# Patient Record
Sex: FEMALE | Race: WHITE
[De-identification: names, ages, dates, MRNs, and addresses within clinical notes are randomized per-mention and may not be internally consistent; named-entity substitution may affect disease eponyms.]

---

## 2018-01-09 ENCOUNTER — HOSPITAL ENCOUNTER (INPATIENT)
Dept: HOSPITAL 7 - FB.MS | Age: 83
LOS: 3 days | Discharge: NURSING, REHAB OR LONG-TERM CARE | DRG: 291 | End: 2018-01-12
Attending: FAMILY MEDICINE | Admitting: FAMILY MEDICINE
Payer: MEDICARE

## 2018-01-09 DIAGNOSIS — E78.00: ICD-10-CM

## 2018-01-09 DIAGNOSIS — Z86.718: ICD-10-CM

## 2018-01-09 DIAGNOSIS — N18.3: ICD-10-CM

## 2018-01-09 DIAGNOSIS — Z87.891: ICD-10-CM

## 2018-01-09 DIAGNOSIS — R29.898: ICD-10-CM

## 2018-01-09 DIAGNOSIS — Z51.5: ICD-10-CM

## 2018-01-09 DIAGNOSIS — Z88.1: ICD-10-CM

## 2018-01-09 DIAGNOSIS — R29.6: ICD-10-CM

## 2018-01-09 DIAGNOSIS — R53.81: ICD-10-CM

## 2018-01-09 DIAGNOSIS — Z86.73: ICD-10-CM

## 2018-01-09 DIAGNOSIS — N17.9: ICD-10-CM

## 2018-01-09 DIAGNOSIS — E03.9: ICD-10-CM

## 2018-01-09 DIAGNOSIS — R53.1: ICD-10-CM

## 2018-01-09 DIAGNOSIS — H54.7: ICD-10-CM

## 2018-01-09 DIAGNOSIS — E11.22: ICD-10-CM

## 2018-01-09 DIAGNOSIS — M19.90: ICD-10-CM

## 2018-01-09 DIAGNOSIS — Z79.84: ICD-10-CM

## 2018-01-09 DIAGNOSIS — I87.2: ICD-10-CM

## 2018-01-09 DIAGNOSIS — R79.89: ICD-10-CM

## 2018-01-09 DIAGNOSIS — I50.31: ICD-10-CM

## 2018-01-09 DIAGNOSIS — Z79.01: ICD-10-CM

## 2018-01-09 DIAGNOSIS — D50.9: ICD-10-CM

## 2018-01-09 DIAGNOSIS — I13.0: Primary | ICD-10-CM

## 2018-01-09 DIAGNOSIS — I35.0: ICD-10-CM

## 2018-01-09 PROCEDURE — P9016 RBC LEUKOCYTES REDUCED: HCPCS

## 2018-01-09 PROCEDURE — 30233N1 TRANSFUSION OF NONAUTOLOGOUS RED BLOOD CELLS INTO PERIPHERAL VEIN, PERCUTANEOUS APPROACH: ICD-10-PCS | Performed by: FAMILY MEDICINE

## 2018-01-09 NOTE — EDM.PDOC
ED HPI GENERAL MEDICAL PROBLEM





- General


Chief Complaint: General


Stated Complaint: FALL


Time Seen by Provider: 18 12:10


Source of Information: Reports: Patient, Old Records


History Limitations: Reports: No Limitations





- History of Present Illness


INITIAL COMMENTS - FREE TEXT/NARRATIVE: 





Trixie comes to [facility] ED by EMS following an incident at home this am when she 

attempted to get off the comode and fell backward back onto the comode, to weak 

to resume activity. She used her LifeLine to summon help and arrived at the ED 

with stable VS, and emotionally shaken by the incident. She has a PMH of 

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 

ambivalent about living alone. She has a son and daughter in law who live in 

the area. 





- Related Data


 Allergies











Allergy/AdvReac Type Severity Reaction Status Date / Time


 


ciprofloxacin [From Cipro] Allergy  Rash Verified 05/15/17 10:04


 


ciprofloxacin HCl Allergy  Rash Verified 05/15/17 10:04





[From Cipro]     











Home Meds: 


 Home Meds





Hydrochlorothiazide 25 mg PO DAILY 10/30/13 [History]


Lisinopril [Prinivil] 40 mg PO DAILY 10/30/13 [History]


Multivitamin with Minerals [Multiple Vitamin] 1 tab PO DAILY 10/30/13 [History]


Nebivolol [Bystolic] 10 mg PO BID 10/30/13 [History]


Pravastatin [Pravachol] 40 mg PO BEDTIME 10/30/13 [History]


Triamcinolone Acetonide [Kenalog 0.1% Crm] 15 gm .XX BID PRN 10/30/13 [History]


Vit A/Vit C/Vit E/Zinc/Copper [Preservision Areds Softgel] 1 each PO PCLUNCH 10/

30/13 [History]


Warfarin [Coumadin] 5 mg PO SUTUTH 10/30/13 [History]


amLODIPine [Norvasc] 10 mg PO PCLUNCH 10/30/13 [History]


metFORMIN HCl [Glucophage] 500 mg PO QPM 10/30/13 [History]


Fexofenadine [Allegra] 60 mg PO DAILY PRN 10/23/14 [History]


Warfarin [Coumadin] 2.5 mg PO MOWEFRSA 10/23/14 [History]


Gabapentin [Neurontin] 100 mg PO BID PRN 16 [History]


Ketotifen Fumarate [Zaditor] 1 drop EYEBOTH BID PRN 16 [History]


Calcium Carbonate [Calcium] 600 mg PO BID@12,18 11/10/16 [History]


Furosemide [Lasix] 40 mg PO DAILY #15 tablet 05/15/17 [Rx]


Levothyroxine [Synthroid] 88 mcg PO ACBREAKFAST 05/15/17 [History]











Past Medical History


HEENT History: Reports: Cataract, Impaired Vision


Cardiovascular History: Reports: Blood Clots/VTE/DVT, Heart Failure, Heart 

Murmur, High Cholesterol, Hypertension


Respiratory History: Reports: None


Gastrointestinal History: Reports: Diverticulosis


Genitourinary History: Reports: Urinary Incontinence, Other (See Below)


Other Genitourinary History: A&P REPAIR,  DYSFUNCTIONAL UTERINE BLEEDING.


OB/GYN History: Reports: Dysfunctional Uterine Bleeding, Pregnancy


Other OB/BYN History:  II PARA II


Musculoskeletal History: Reports: Osteoarthritis


Neurological History: Reports: CVA


Psychiatric History: Reports: None


Endocrine/Metabolic History: Reports: Diabetes, Type II, Hypothyroidism, Obesity

/BMI 30+


Hematologic History: Reports: Anemia


Other Hematologic History: POST SURGICAL FROM DUB


Immunologic History: Reports: None


Oncologic (Cancer) History: Reports: None


Dermatologic History: Reports: Venous Stasis Dermatitis





- Infectious Disease History


Infectious Disease History: Reports: Chicken Pox, Measles, Mumps





- Past Surgical History


Head Surgeries/Procedures: Reports: None


HEENT Surgical History: Reports: Cataract Surgery


GI Surgical History: Reports: Appendectomy, Cholecystectomy, Colon, Colonoscopy

, Hernia, Abdominal, Hernia Repair/Other


Female  Surgical History: Reports: D&C, Hysterectomy, Oophorectomy, Salpingo-

Oophorectomy





Social & Family History





- Family History


Other HEENT Family History: SEE HX





- Tobacco Use


Smoking Status *Q: Former Smoker


Years of Tobacco use: 5


Packs/Tins Daily: 0.2


Used Tobacco, but Quit: Yes


Month Tobacco Last Used: 1970


Second Hand Smoke Exposure: No





- Caffeine Use


Caffeine Use: Reports: Coffee, Soda





- Alcohol Use


Days Per Week of Alcohol Use: 0





- Recreational Drug Use


Recreational Drug Use: No





ED ROS GENERAL





- Review of Systems


Review Of Systems: See Below


Constitutional: Reports: Weakness, Fatigue


HEENT: Reports: No Symptoms


Respiratory: Reports: No Symptoms


Cardiovascular: Reports: No Symptoms


Endocrine: Reports: Fatigue


GI/Abdominal: Reports: No Symptoms


: Reports: No Symptoms


Musculoskeletal: Reports: Leg Pain (both legs)


Skin: Reports: Other (chronic swelling in both legs)


Neurological: Reports: Difficulty Walking, Weakness


Psychiatric: Reports: Anxiety


Hematologic/Lymphatic: Reports: No Symptoms


Immunologic: Reports: No Symptoms





ED EXAM, GENERAL





- Physical Exam


Exam: See Below


Exam Limited By: No Limitations


General Appearance: Alert, WD/WN, No Apparent Distress, Anxious


Eye Exam: Bilateral Eye: EOMI, Normal Inspection, PERRL


Ears: Normal External Exam, Hearing Grossly Normal


Nose: Normal Inspection


Throat/Mouth: Normal Inspection, Normal Oropharynx, Normal Voice, No Airway 

Compromise


Head: Atraumatic, Normocephalic


Neck: Normal Inspection, Supple, Non-Tender, Full Range of Motion


Respiratory/Chest: No Respiratory Distress, No Accessory Muscle Use, Chest Non-

Tender, Decreased Breath Sounds, Rales (basilar)


Cardiovascular: Regular Rate, Rhythm, No JVD


GI/Abdominal: Normal Bowel Sounds, Soft, Non-Tender, No Organomegaly, No 

Distention, No Mass


 (Female) Exam: Deferred


Rectal (Female) Exam: Deferred


Extremities: Normal Inspection, Normal Range of Motion, Non-Tender, Pedal Edema 

(brawny edema of both legs to upper calves)


Neurological: Alert, Oriented, CN II-XII Intact, Normal Cognition, Other (

weakness, suggestive of deconditioning)


Psychiatric: Anxious


Skin Exam: Intact, Other (chronic edema with stasis changes)


Lymphatic: No Adenopathy





Course





- Vital Signs


Text/Narrative:: 





Following assessment at the [facility] ED, screening labs included: Hgb 8.4 gm, WBC 4,

000, plts nl; Na 133, K 3.9; BUN 37, Cr 1.4, GFR 36; HgbA1c 7.2%; nonFBS 216; 

BNP 8130 today (612 on 17); chest x ray: cardiac enlargement with some 

interstitial edema; case discussed with Hospitalist and she will be admitted. 


Last Recorded V/S: 


 Last Vital Signs











Temp  36.7 C   18 11:45


 


Pulse  73   18 11:45


 


Resp  16   18 11:45


 


BP  104/41 L  18 11:45


 


Pulse Ox  97   18 11:45














- Orders/Labs/Meds


Orders: 


 Active Orders 24 hr











 Category Date Time Status


 


 Chest 1V Frontal [CR] Stat Exams  18 13:29 Taken











Labs: 


 Laboratory Tests











  18 Range/Units





  12:30 12:30 12:30 


 


WBC  4.0 L    (4.5-12.0)  X10-3/uL


 


RBC  3.20 L    (3.23-5.20)  x10(6)uL


 


Hgb  8.4 L    (11.5-15.5)  g/dL


 


Hct  26.3 L    (30.0-51.3)  %


 


MCV  82.0    (80-96)  fL


 


MCH  26.2 L    (27.7-33.6)  pg


 


MCHC  31.9 L    (32.2-35.4)  g/dL


 


RDW  15.4    (11.5-15.5)  %


 


Plt Count  197    (125-369)  X10(3)uL


 


MPV  7.6    (7.4-10.4)  fL


 


Neut % (Auto)  63.5    (46-82)  %


 


Lymph % (Auto)  16.1    (13-37)  %


 


Mono % (Auto)  19.3 H    (4-12)  %


 


Eos % (Auto)  1    (1.0-5.0)  %


 


Baso % (Auto)  0    (0-2)  %


 


Neut # (Auto)  2.6    (1.6-8.3)  #


 


Lymph # (Auto)  0.6    (0.6-5.0)  #


 


Mono # (Auto)  0.8    (0.0-1.3)  #


 


Eos # (Auto)  0.0    (0.0-0.8)  #


 


Baso # (Auto)  0.0    (0.0-0.2)  #


 


PT   21.1 H   (8.7-11.1)  


 


INR   2.06 H   (0.89-1.13)  


 


Sodium    133 L  (135-145)  mmol/L


 


Potassium    3.9  (3.5-5.3)  mmol/L


 


Chloride    97 L  (100-110)  mmol/L


 


Carbon Dioxide    26  (21-32)  mmol/L


 


BUN    37 H  (7-18)  mg/dL


 


Creatinine    1.4 H  (0.55-1.02)  mg/dL


 


Est Cr Clr Drug Dosing    26.29  mL/min


 


Estimated GFR (MDRD)    36 L  (>60)  


 


BUN/Creatinine Ratio    26.4 H  (9-20)  


 


Glucose    216 H  ()  mg/dL


 


Hemoglobin A1c     (4.5-6.2)  %


 


Calcium    8.7  (8.6-10.2)  mg/dL


 


Total Bilirubin    0.5  (0.1-1.3)  mg/dL


 


AST    30 H  (5-25)  IU/L


 


ALT    18  (12-36)  U/L


 


Alkaline Phosphatase    62  ()  IU/L


 


NT-Pro-B Natriuret Pep     (<=450)  pg/mL


 


Total Protein    7.1  (6.0-8.0)  g/dL


 


Albumin    2.8 L  (3.2-4.6)  g/dL


 


Globulin    4.3  g/dL


 


Albumin/Globulin Ratio    0.7  


 


Urine Color     (YELLOW)  


 


Urine Appearance     (CLEAR)  


 


Urine pH     (5.0-6.5)  


 


Ur Specific Gravity     (1.010-1.025)  


 


Urine Protein     (NEGATIVE)  mg/dL


 


Urine Glucose (UA)     (NEGATIVE)  mg/dL


 


Urine Ketones     (NEGATIVE)  mg/dL


 


Urine Occult Blood     (NEGATIVE)  


 


Urine Nitrite     (NEGATIVE)  


 


Urine Bilirubin     (NEGATIVE)  


 


Urine Urobilinogen     (NEGATIVE)  mg/dL


 


Ur Leukocyte Esterase     (NEGATIVE)  


 


Urine RBC     (0)  


 


Urine WBC     (0)  


 


Ur Squamous Epith Cells     (NS,R,O)  


 


Urine Bacteria     (NS)  


 


Fine Granular Casts     (NS)  


 


Coarse Granular Casts     (NS)  














  18 Range/Units





  12:30 12:30 13:26 


 


WBC     (4.5-12.0)  X10-3/uL


 


RBC     (3.23-5.20)  x10(6)uL


 


Hgb     (11.5-15.5)  g/dL


 


Hct     (30.0-51.3)  %


 


MCV     (80-96)  fL


 


MCH     (27.7-33.6)  pg


 


MCHC     (32.2-35.4)  g/dL


 


RDW     (11.5-15.5)  %


 


Plt Count     (125-369)  X10(3)uL


 


MPV     (7.4-10.4)  fL


 


Neut % (Auto)     (46-82)  %


 


Lymph % (Auto)     (13-37)  %


 


Mono % (Auto)     (4-12)  %


 


Eos % (Auto)     (1.0-5.0)  %


 


Baso % (Auto)     (0-2)  %


 


Neut # (Auto)     (1.6-8.3)  #


 


Lymph # (Auto)     (0.6-5.0)  #


 


Mono # (Auto)     (0.0-1.3)  #


 


Eos # (Auto)     (0.0-0.8)  #


 


Baso # (Auto)     (0.0-0.2)  #


 


PT     (8.7-11.1)  


 


INR     (0.89-1.13)  


 


Sodium     (135-145)  mmol/L


 


Potassium     (3.5-5.3)  mmol/L


 


Chloride     (100-110)  mmol/L


 


Carbon Dioxide     (21-32)  mmol/L


 


BUN     (7-18)  mg/dL


 


Creatinine     (0.55-1.02)  mg/dL


 


Est Cr Clr Drug Dosing     mL/min


 


Estimated GFR (MDRD)     (>60)  


 


BUN/Creatinine Ratio     (9-20)  


 


Glucose     ()  mg/dL


 


Hemoglobin A1c   7.2 H   (4.5-6.2)  %


 


Calcium     (8.6-10.2)  mg/dL


 


Total Bilirubin     (0.1-1.3)  mg/dL


 


AST     (5-25)  IU/L


 


ALT     (12-36)  U/L


 


Alkaline Phosphatase     ()  IU/L


 


NT-Pro-B Natriuret Pep  8130 H*    (<=450)  pg/mL


 


Total Protein     (6.0-8.0)  g/dL


 


Albumin     (3.2-4.6)  g/dL


 


Globulin     g/dL


 


Albumin/Globulin Ratio     


 


Urine Color    Yellow  (YELLOW)  


 


Urine Appearance    Clear  (CLEAR)  


 


Urine pH    5.0  (5.0-6.5)  


 


Ur Specific Gravity    1.010  (1.010-1.025)  


 


Urine Protein    Negative  (NEGATIVE)  mg/dL


 


Urine Glucose (UA)    Normal  (NEGATIVE)  mg/dL


 


Urine Ketones    Negative  (NEGATIVE)  mg/dL


 


Urine Occult Blood    Negative  (NEGATIVE)  


 


Urine Nitrite    Negative  (NEGATIVE)  


 


Urine Bilirubin    Negative  (NEGATIVE)  


 


Urine Urobilinogen    Normal  (NEGATIVE)  mg/dL


 


Ur Leukocyte Esterase    Negative  (NEGATIVE)  


 


Urine RBC    0-5  (0)  


 


Urine WBC    0-5  (0)  


 


Ur Squamous Epith Cells    Few H  (NS,R,O)  


 


Urine Bacteria    Moderate H  (NS)  


 


Fine Granular Casts    Few H  (NS)  


 


Coarse Granular Casts    Few H  (NS)  














Departure





- Departure


Time of Disposition: 14:12


Disposition: Admitted As Inpatient 66


Condition: Fair


Clinical Impression: 


 CHF, Congestive heart failure, Weakness of both lower extremities








- Discharge Information


Referrals: 


Mabel Ruiz NP [Primary Care Provider] - 


Forms:  ED Department Discharge





- Problem List & Annotations


(1) CHF, Congestive heart failure


SNOMED Code(s): 66457061


   Code(s): I50.9 - HEART FAILURE, UNSPECIFIED   Status: Acute   Current Visit: 

Yes   Annotation/Comment:: Admit to In Pt   





- Problem List Review


Problem List Initiated/Reviewed/Updated: Yes





- My Orders


Last 24 Hours: 


My Active Orders





18 13:29


Chest 1V Frontal [CR] Stat 














- Assessment/Plan


Last 24 Hours: 


My Active Orders





18 13:29


Chest 1V Frontal [CR] Stat 











Plan: 





Follow up with PCP.
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
## 2018-01-10 NOTE — CR
INDICATION:  Weakness, high BNP.



CHEST:  An AP upright view of the chest, 01/09/2018, was compared with 05/14/
2017 and again reveals the heart to be enlarged, the aorta tortuous and 
calcified. 



Overlying snaps are noted. 



Pulmonary vasculature appears to be prominent, compatible with CHF.  



Minimal, if any, interstitial lung edema is present.  



No active infiltrate or effusion was seen.  



IMPRESSION:  Findings are compatible with ASHD, cardiomegaly, with mild to 
moderate CHF.  No definite lung edema identified.  
MTDD

## 2018-01-10 NOTE — PCM.PN
- General Info


Date of Service: 01/10/18


Subjective Update: 





Pleasant 83-year-old female awake lying in bed. Tells me she feels a bit better 

with regards to no more tremulousness. Still feels weak. No longer pale. She 

denies headache, tinnitus, blurred vision, nasal congestion or sore throat, no 

coughing, shortness of breath, chest pain or pressure, abdominal pain, or 

worsening pain of the lower extremities. She is tolerating the Ace wraps quite 

well. She has been getting up to the restroom and has made good urine output. 

She is tolerating her diet. Is less anxious this morning. No overnight events 

on telemetry. Nursing without concerns.








- Related Data


Allergies/Adverse Reactions: 


 Allergies











Allergy/AdvReac Type Severity Reaction Status Date / Time


 


ciprofloxacin [From Cipro] Allergy  Rash Verified 05/15/17 10:04


 


ciprofloxacin HCl Allergy  Rash Verified 05/15/17 10:04





[From Cipro]     











Prior to admission Home Medications: 


 Home Meds





Hydrochlorothiazide 25 mg PO DAILY 10/30/13 [History]


Lisinopril [Prinivil] 40 mg PO DAILY 10/30/13 [History]


Multivitamin with Minerals [Multiple Vitamin] 2 tab PO DAILY 10/30/13 [History]


Nebivolol [Bystolic] 10 mg PO BID 10/30/13 [History]


Pravastatin [Pravachol] 40 mg PO BEDTIME 10/30/13 [History]


Triamcinolone Acetonide [Kenalog 0.1% Crm] 15 gm .XX BID PRN 10/30/13 [History]


Vit A/Vit C/Vit E/Zinc/Copper [Preservision Areds Softgel] 1 each PO PCLUNCH 10/

30/13 [History]


metFORMIN HCl [Glucophage] 500 mg PO QPM 10/30/13 [History]


Warfarin [Coumadin] 2.5 mg PO 1600 10/23/14 [History]


Ketotifen Fumarate [Zaditor] 1 drop EYEBOTH BID PRN 11/09/16 [History]


Calcium Carbonate [Calcium] 600 mg PO 10,18 11/10/16 [History]


Furosemide [Lasix] 40 mg PO DAILY #15 tablet 05/15/17 [Rx]


Levothyroxine [Synthroid] 88 mcg PO ACBREAKFAST 05/15/17 [History]


Ferrous Gluconate 324 mg PO DAILY 01/09/18 [History]


amLODIPine [Norvasc] 2.5 mg PO DAILY 01/09/18 [History]





Functional Status: Reports: Tolerating Diet, Ambulating, Urinating.  Denies: 

New Symptoms





- Review of Systems


Systems Review Comment:: 





Please see above for pertinent positives and negatives





- Patient Data


Vitals - Most Recent: 





 Vital Signs - 8 hr











  01/10/18 01/10/18





  07:25 10:50


 


Temperature [ 97.9 F 





Oral]  


 


Pulse,  67





Peripheral  


 


Pulse, 67 





Peripheral [  





Left Pulse  





Oximetry]  


 


Respiratory 20 





Rate  


 


Blood Pressure  126/62


 


Blood Pressure 126/62 





[Left Upper Arm  





]  


 


O2 Sat by Pulse 94 L 





Oximetry  














Orthostatic Blood Pressure [     128/60


Standing]                        


Orthostatic Blood Pressure [     120/56


Sitting]                         


Orthostatic Blood Pressure [     110/58


Supine]                          








Weight - Most Recent: 86.636 kg


I&O - Last 24 Hours: 


 Intake & Output











 01/09/18 01/10/18 01/10/18





 22:59 06:59 14:59


 


Intake Total 130 358 


 


Output Total 750 800 


 


Balance -620 -442 








Weight on admit: 87.407 kg


Weight today: 86.636 kg


Total weight loss: 0.771 kg (1.7 pound)





Overall urine output since admission roughly 1500 mL


Lab Results Last 24 Hours: 


 


 Laboratory Tests











  01/09/18 01/09/18 01/09/18 Range/Units





  12:30 12:30 12:30 


 


WBC  4.0 L    (4.5-12.0)  X10-3/uL


 


RBC  3.20 L    (3.23-5.20)  x10(6)uL


 


Hgb  8.4 L    (11.5-15.5)  g/dL


 


Hct  26.3 L    (30.0-51.3)  %


 


MCV  82.0    (80-96)  fL


 


MCH  26.2 L    (27.7-33.6)  pg


 


MCHC  31.9 L    (32.2-35.4)  g/dL


 


RDW  15.4    (11.5-15.5)  %


 


Plt Count  197    (125-369)  X10(3)uL


 


MPV  7.6    (7.4-10.4)  fL


 


Neut % (Auto)  63.5    (46-82)  %


 


Lymph % (Auto)  16.1    (13-37)  %


 


Mono % (Auto)  19.3 H    (4-12)  %


 


Eos % (Auto)  1    (1.0-5.0)  %


 


Baso % (Auto)  0    (0-2)  %


 


Neut # (Auto)  2.6    (1.6-8.3)  #


 


Lymph # (Auto)  0.6    (0.6-5.0)  #


 


Mono # (Auto)  0.8    (0.0-1.3)  #


 


Eos # (Auto)  0.0    (0.0-0.8)  #


 


Baso # (Auto)  0.0    (0.0-0.2)  #


 


PT   21.1 H   (8.7-11.1)  


 


INR   2.06 H   (0.89-1.13)  


 


Sodium    133 L  (135-145)  mmol/L


 


Potassium    3.9  (3.5-5.3)  mmol/L


 


Chloride    97 L  (100-110)  mmol/L


 


Carbon Dioxide    26  (21-32)  mmol/L


 


BUN    37 H  (7-18)  mg/dL


 


Creatinine    1.4 H  (0.55-1.02)  mg/dL


 


Est Cr Clr Drug Dosing    26.29  mL/min


 


Estimated GFR (MDRD)    36 L  (>60)  


 


BUN/Creatinine Ratio    26.4 H  (9-20)  


 


Glucose    216 H  ()  mg/dL


 


POC Glucose     ()  mg/dL


 


Hemoglobin A1c     (4.5-6.2)  %


 


Calcium    8.7  (8.6-10.2)  mg/dL


 


Total Bilirubin    0.5  (0.1-1.3)  mg/dL


 


AST    30 H  (5-25)  IU/L


 


ALT    18  (12-36)  U/L


 


Alkaline Phosphatase    62  ()  IU/L


 


NT-Pro-B Natriuret Pep     (<=450)  pg/mL


 


Total Protein    7.1  (6.0-8.0)  g/dL


 


Albumin    2.8 L  (3.2-4.6)  g/dL


 


Globulin    4.3  g/dL


 


Albumin/Globulin Ratio    0.7  


 


Vitamin B12     (193-986)  pg/mL


 


TSH, Ultra Sensitive     (0.36-3.74)  IU/mL


 


Urine Color     (YELLOW)  


 


Urine Appearance     (CLEAR)  


 


Urine pH     (5.0-6.5)  


 


Ur Specific Gravity     (1.010-1.025)  


 


Urine Protein     (NEGATIVE)  mg/dL


 


Urine Glucose (UA)     (NEGATIVE)  mg/dL


 


Urine Ketones     (NEGATIVE)  mg/dL


 


Urine Occult Blood     (NEGATIVE)  


 


Urine Nitrite     (NEGATIVE)  


 


Urine Bilirubin     (NEGATIVE)  


 


Urine Urobilinogen     (NEGATIVE)  mg/dL


 


Ur Leukocyte Esterase     (NEGATIVE)  


 


Urine RBC     (0)  


 


Urine WBC     (0)  


 


Ur Squamous Epith Cells     (NS,R,O)  


 


Urine Bacteria     (NS)  


 


Fine Granular Casts     (NS)  


 


Coarse Granular Casts     (NS)  


 


Blood Type     


 


Gel Antibody Screen     


 


Crossmatch     














  01/09/18 01/09/18 01/09/18 Range/Units





  12:30 12:30 12:30 


 


WBC     (4.5-12.0)  X10-3/uL


 


RBC     (3.23-5.20)  x10(6)uL


 


Hgb     (11.5-15.5)  g/dL


 


Hct     (30.0-51.3)  %


 


MCV     (80-96)  fL


 


MCH     (27.7-33.6)  pg


 


MCHC     (32.2-35.4)  g/dL


 


RDW     (11.5-15.5)  %


 


Plt Count     (125-369)  X10(3)uL


 


MPV     (7.4-10.4)  fL


 


Neut % (Auto)     (46-82)  %


 


Lymph % (Auto)     (13-37)  %


 


Mono % (Auto)     (4-12)  %


 


Eos % (Auto)     (1.0-5.0)  %


 


Baso % (Auto)     (0-2)  %


 


Neut # (Auto)     (1.6-8.3)  #


 


Lymph # (Auto)     (0.6-5.0)  #


 


Mono # (Auto)     (0.0-1.3)  #


 


Eos # (Auto)     (0.0-0.8)  #


 


Baso # (Auto)     (0.0-0.2)  #


 


PT     (8.7-11.1)  


 


INR     (0.89-1.13)  


 


Sodium     (135-145)  mmol/L


 


Potassium     (3.5-5.3)  mmol/L


 


Chloride     (100-110)  mmol/L


 


Carbon Dioxide     (21-32)  mmol/L


 


BUN     (7-18)  mg/dL


 


Creatinine     (0.55-1.02)  mg/dL


 


Est Cr Clr Drug Dosing     mL/min


 


Estimated GFR (MDRD)     (>60)  


 


BUN/Creatinine Ratio     (9-20)  


 


Glucose     ()  mg/dL


 


POC Glucose     ()  mg/dL


 


Hemoglobin A1c   7.2 H   (4.5-6.2)  %


 


Calcium     (8.6-10.2)  mg/dL


 


Total Bilirubin     (0.1-1.3)  mg/dL


 


AST     (5-25)  IU/L


 


ALT     (12-36)  U/L


 


Alkaline Phosphatase     ()  IU/L


 


NT-Pro-B Natriuret Pep  8130 H*    (<=450)  pg/mL


 


Total Protein     (6.0-8.0)  g/dL


 


Albumin     (3.2-4.6)  g/dL


 


Globulin     g/dL


 


Albumin/Globulin Ratio     


 


Vitamin B12     (193-986)  pg/mL


 


TSH, Ultra Sensitive     (0.36-3.74)  IU/mL


 


Urine Color     (YELLOW)  


 


Urine Appearance     (CLEAR)  


 


Urine pH     (5.0-6.5)  


 


Ur Specific Gravity     (1.010-1.025)  


 


Urine Protein     (NEGATIVE)  mg/dL


 


Urine Glucose (UA)     (NEGATIVE)  mg/dL


 


Urine Ketones     (NEGATIVE)  mg/dL


 


Urine Occult Blood     (NEGATIVE)  


 


Urine Nitrite     (NEGATIVE)  


 


Urine Bilirubin     (NEGATIVE)  


 


Urine Urobilinogen     (NEGATIVE)  mg/dL


 


Ur Leukocyte Esterase     (NEGATIVE)  


 


Urine RBC     (0)  


 


Urine WBC     (0)  


 


Ur Squamous Epith Cells     (NS,R,O)  


 


Urine Bacteria     (NS)  


 


Fine Granular Casts     (NS)  


 


Coarse Granular Casts     (NS)  


 


Blood Type    A POSITIVE  


 


Gel Antibody Screen    Negative  


 


Crossmatch    See Detail  














  01/09/18 01/09/18 01/09/18 Range/Units





  12:30 13:26 17:08 


 


WBC     (4.5-12.0)  X10-3/uL


 


RBC     (3.23-5.20)  x10(6)uL


 


Hgb     (11.5-15.5)  g/dL


 


Hct     (30.0-51.3)  %


 


MCV     (80-96)  fL


 


MCH     (27.7-33.6)  pg


 


MCHC     (32.2-35.4)  g/dL


 


RDW     (11.5-15.5)  %


 


Plt Count     (125-369)  X10(3)uL


 


MPV     (7.4-10.4)  fL


 


Neut % (Auto)     (46-82)  %


 


Lymph % (Auto)     (13-37)  %


 


Mono % (Auto)     (4-12)  %


 


Eos % (Auto)     (1.0-5.0)  %


 


Baso % (Auto)     (0-2)  %


 


Neut # (Auto)     (1.6-8.3)  #


 


Lymph # (Auto)     (0.6-5.0)  #


 


Mono # (Auto)     (0.0-1.3)  #


 


Eos # (Auto)     (0.0-0.8)  #


 


Baso # (Auto)     (0.0-0.2)  #


 


PT     (8.7-11.1)  


 


INR     (0.89-1.13)  


 


Sodium     (135-145)  mmol/L


 


Potassium     (3.5-5.3)  mmol/L


 


Chloride     (100-110)  mmol/L


 


Carbon Dioxide     (21-32)  mmol/L


 


BUN     (7-18)  mg/dL


 


Creatinine     (0.55-1.02)  mg/dL


 


Est Cr Clr Drug Dosing     mL/min


 


Estimated GFR (MDRD)     (>60)  


 


BUN/Creatinine Ratio     (9-20)  


 


Glucose     ()  mg/dL


 


POC Glucose    184 H  ()  mg/dL


 


Hemoglobin A1c     (4.5-6.2)  %


 


Calcium     (8.6-10.2)  mg/dL


 


Total Bilirubin     (0.1-1.3)  mg/dL


 


AST     (5-25)  IU/L


 


ALT     (12-36)  U/L


 


Alkaline Phosphatase     ()  IU/L


 


NT-Pro-B Natriuret Pep     (<=450)  pg/mL


 


Total Protein     (6.0-8.0)  g/dL


 


Albumin     (3.2-4.6)  g/dL


 


Globulin     g/dL


 


Albumin/Globulin Ratio     


 


Vitamin B12     (193-986)  pg/mL


 


TSH, Ultra Sensitive  6.32 H    (0.36-3.74)  IU/mL


 


Urine Color   Yellow   (YELLOW)  


 


Urine Appearance   Clear   (CLEAR)  


 


Urine pH   5.0   (5.0-6.5)  


 


Ur Specific Gravity   1.010   (1.010-1.025)  


 


Urine Protein   Negative   (NEGATIVE)  mg/dL


 


Urine Glucose (UA)   Normal   (NEGATIVE)  mg/dL


 


Urine Ketones   Negative   (NEGATIVE)  mg/dL


 


Urine Occult Blood   Negative   (NEGATIVE)  


 


Urine Nitrite   Negative   (NEGATIVE)  


 


Urine Bilirubin   Negative   (NEGATIVE)  


 


Urine Urobilinogen   Normal   (NEGATIVE)  mg/dL


 


Ur Leukocyte Esterase   Negative   (NEGATIVE)  


 


Urine RBC   0-5   (0)  


 


Urine WBC   0-5   (0)  


 


Ur Squamous Epith Cells   Few H   (NS,R,O)  


 


Urine Bacteria   Moderate H   (NS)  


 


Fine Granular Casts   Few H   (NS)  


 


Coarse Granular Casts   Few H   (NS)  


 


Blood Type     


 


Gel Antibody Screen     


 


Crossmatch     














  01/09/18 01/10/18 01/10/18 Range/Units





  20:52 09:15 09:15 


 


WBC   2.9 L   (4.5-12.0)  X10-3/uL


 


RBC   3.55   (3.23-5.20)  x10(6)uL


 


Hgb   9.3 L   (11.5-15.5)  g/dL


 


Hct   29.3 L   (30.0-51.3)  %


 


MCV   82.6   (80-96)  fL


 


MCH   26.3 L   (27.7-33.6)  pg


 


MCHC   31.8 L   (32.2-35.4)  g/dL


 


RDW   15.2   (11.5-15.5)  %


 


Plt Count   178   (125-369)  X10(3)uL


 


MPV     (7.4-10.4)  fL


 


Neut % (Auto)     (46-82)  %


 


Lymph % (Auto)     (13-37)  %


 


Mono % (Auto)     (4-12)  %


 


Eos % (Auto)     (1.0-5.0)  %


 


Baso % (Auto)     (0-2)  %


 


Neut # (Auto)     (1.6-8.3)  #


 


Lymph # (Auto)     (0.6-5.0)  #


 


Mono # (Auto)     (0.0-1.3)  #


 


Eos # (Auto)     (0.0-0.8)  #


 


Baso # (Auto)     (0.0-0.2)  #


 


PT     (8.7-11.1)  


 


INR     (0.89-1.13)  


 


Sodium    135  (135-145)  mmol/L


 


Potassium    3.4 L  (3.5-5.3)  mmol/L


 


Chloride    98 L  (100-110)  mmol/L


 


Carbon Dioxide    28  (21-32)  mmol/L


 


BUN    28 H  (7-18)  mg/dL


 


Creatinine    1.3 H  (0.55-1.02)  mg/dL


 


Est Cr Clr Drug Dosing    28.31  mL/min


 


Estimated GFR (MDRD)    39 L  (>60)  


 


BUN/Creatinine Ratio    21.5 H  (9-20)  


 


Glucose    236 H  ()  mg/dL


 


POC Glucose  201 H    ()  mg/dL


 


Hemoglobin A1c     (4.5-6.2)  %


 


Calcium    8.9  (8.6-10.2)  mg/dL


 


Total Bilirubin     (0.1-1.3)  mg/dL


 


AST     (5-25)  IU/L


 


ALT     (12-36)  U/L


 


Alkaline Phosphatase     ()  IU/L


 


NT-Pro-B Natriuret Pep     (<=450)  pg/mL


 


Total Protein     (6.0-8.0)  g/dL


 


Albumin     (3.2-4.6)  g/dL


 


Globulin     g/dL


 


Albumin/Globulin Ratio     


 


Vitamin B12     (193-986)  pg/mL


 


TSH, Ultra Sensitive     (0.36-3.74)  IU/mL


 


Urine Color     (YELLOW)  


 


Urine Appearance     (CLEAR)  


 


Urine pH     (5.0-6.5)  


 


Ur Specific Gravity     (1.010-1.025)  


 


Urine Protein     (NEGATIVE)  mg/dL


 


Urine Glucose (UA)     (NEGATIVE)  mg/dL


 


Urine Ketones     (NEGATIVE)  mg/dL


 


Urine Occult Blood     (NEGATIVE)  


 


Urine Nitrite     (NEGATIVE)  


 


Urine Bilirubin     (NEGATIVE)  


 


Urine Urobilinogen     (NEGATIVE)  mg/dL


 


Ur Leukocyte Esterase     (NEGATIVE)  


 


Urine RBC     (0)  


 


Urine WBC     (0)  


 


Ur Squamous Epith Cells     (NS,R,O)  


 


Urine Bacteria     (NS)  


 


Fine Granular Casts     (NS)  


 


Coarse Granular Casts     (NS)  


 


Blood Type     


 


Gel Antibody Screen     


 


Crossmatch     














  01/10/18 01/10/18 01/10/18 Range/Units





  09:15 09:15 09:15 


 


WBC     (4.5-12.0)  X10-3/uL


 


RBC     (3.23-5.20)  x10(6)uL


 


Hgb     (11.5-15.5)  g/dL


 


Hct     (30.0-51.3)  %


 


MCV     (80-96)  fL


 


MCH     (27.7-33.6)  pg


 


MCHC     (32.2-35.4)  g/dL


 


RDW     (11.5-15.5)  %


 


Plt Count     (125-369)  X10(3)uL


 


MPV     (7.4-10.4)  fL


 


Neut % (Auto)     (46-82)  %


 


Lymph % (Auto)     (13-37)  %


 


Mono % (Auto)     (4-12)  %


 


Eos % (Auto)     (1.0-5.0)  %


 


Baso % (Auto)     (0-2)  %


 


Neut # (Auto)     (1.6-8.3)  #


 


Lymph # (Auto)     (0.6-5.0)  #


 


Mono # (Auto)     (0.0-1.3)  #


 


Eos # (Auto)     (0.0-0.8)  #


 


Baso # (Auto)     (0.0-0.2)  #


 


PT  22.4 H    (8.7-11.1)  


 


INR  2.18 H    (0.89-1.13)  


 


Sodium     (135-145)  mmol/L


 


Potassium     (3.5-5.3)  mmol/L


 


Chloride     (100-110)  mmol/L


 


Carbon Dioxide     (21-32)  mmol/L


 


BUN     (7-18)  mg/dL


 


Creatinine     (0.55-1.02)  mg/dL


 


Est Cr Clr Drug Dosing     mL/min


 


Estimated GFR (MDRD)     (>60)  


 


BUN/Creatinine Ratio     (9-20)  


 


Glucose     ()  mg/dL


 


POC Glucose     ()  mg/dL


 


Hemoglobin A1c     (4.5-6.2)  %


 


Calcium     (8.6-10.2)  mg/dL


 


Total Bilirubin     (0.1-1.3)  mg/dL


 


AST     (5-25)  IU/L


 


ALT     (12-36)  U/L


 


Alkaline Phosphatase     ()  IU/L


 


NT-Pro-B Natriuret Pep   32928 H*   (<=450)  pg/mL


 


Total Protein     (6.0-8.0)  g/dL


 


Albumin     (3.2-4.6)  g/dL


 


Globulin     g/dL


 


Albumin/Globulin Ratio     


 


Vitamin B12    685  (193-986)  pg/mL


 


TSH, Ultra Sensitive     (0.36-3.74)  IU/mL


 


Urine Color     (YELLOW)  


 


Urine Appearance     (CLEAR)  


 


Urine pH     (5.0-6.5)  


 


Ur Specific Gravity     (1.010-1.025)  


 


Urine Protein     (NEGATIVE)  mg/dL


 


Urine Glucose (UA)     (NEGATIVE)  mg/dL


 


Urine Ketones     (NEGATIVE)  mg/dL


 


Urine Occult Blood     (NEGATIVE)  


 


Urine Nitrite     (NEGATIVE)  


 


Urine Bilirubin     (NEGATIVE)  


 


Urine Urobilinogen     (NEGATIVE)  mg/dL


 


Ur Leukocyte Esterase     (NEGATIVE)  


 


Urine RBC     (0)  


 


Urine WBC     (0)  


 


Ur Squamous Epith Cells     (NS,R,O)  


 


Urine Bacteria     (NS)  


 


Fine Granular Casts     (NS)  


 


Coarse Granular Casts     (NS)  


 


Blood Type     


 


Gel Antibody Screen     


 


Crossmatch     














  01/10/18 Range/Units





  11:23 


 


WBC   (4.5-12.0)  X10-3/uL


 


RBC   (3.23-5.20)  x10(6)uL


 


Hgb   (11.5-15.5)  g/dL


 


Hct   (30.0-51.3)  %


 


MCV   (80-96)  fL


 


MCH   (27.7-33.6)  pg


 


MCHC   (32.2-35.4)  g/dL


 


RDW   (11.5-15.5)  %


 


Plt Count   (125-369)  X10(3)uL


 


MPV   (7.4-10.4)  fL


 


Neut % (Auto)   (46-82)  %


 


Lymph % (Auto)   (13-37)  %


 


Mono % (Auto)   (4-12)  %


 


Eos % (Auto)   (1.0-5.0)  %


 


Baso % (Auto)   (0-2)  %


 


Neut # (Auto)   (1.6-8.3)  #


 


Lymph # (Auto)   (0.6-5.0)  #


 


Mono # (Auto)   (0.0-1.3)  #


 


Eos # (Auto)   (0.0-0.8)  #


 


Baso # (Auto)   (0.0-0.2)  #


 


PT   (8.7-11.1)  


 


INR   (0.89-1.13)  


 


Sodium   (135-145)  mmol/L


 


Potassium   (3.5-5.3)  mmol/L


 


Chloride   (100-110)  mmol/L


 


Carbon Dioxide   (21-32)  mmol/L


 


BUN   (7-18)  mg/dL


 


Creatinine   (0.55-1.02)  mg/dL


 


Est Cr Clr Drug Dosing   mL/min


 


Estimated GFR (MDRD)   (>60)  


 


BUN/Creatinine Ratio   (9-20)  


 


Glucose   ()  mg/dL


 


POC Glucose  191 H  ()  mg/dL


 


Hemoglobin A1c   (4.5-6.2)  %


 


Calcium   (8.6-10.2)  mg/dL


 


Total Bilirubin   (0.1-1.3)  mg/dL


 


AST   (5-25)  IU/L


 


ALT   (12-36)  U/L


 


Alkaline Phosphatase   ()  IU/L


 


NT-Pro-B Natriuret Pep   (<=450)  pg/mL


 


Total Protein   (6.0-8.0)  g/dL


 


Albumin   (3.2-4.6)  g/dL


 


Globulin   g/dL


 


Albumin/Globulin Ratio   


 


Vitamin B12   (193-986)  pg/mL


 


TSH, Ultra Sensitive   (0.36-3.74)  IU/mL


 


Urine Color   (YELLOW)  


 


Urine Appearance   (CLEAR)  


 


Urine pH   (5.0-6.5)  


 


Ur Specific Gravity   (1.010-1.025)  


 


Urine Protein   (NEGATIVE)  mg/dL


 


Urine Glucose (UA)   (NEGATIVE)  mg/dL


 


Urine Ketones   (NEGATIVE)  mg/dL


 


Urine Occult Blood   (NEGATIVE)  


 


Urine Nitrite   (NEGATIVE)  


 


Urine Bilirubin   (NEGATIVE)  


 


Urine Urobilinogen   (NEGATIVE)  mg/dL


 


Ur Leukocyte Esterase   (NEGATIVE)  


 


Urine RBC   (0)  


 


Urine WBC   (0)  


 


Ur Squamous Epith Cells   (NS,R,O)  


 


Urine Bacteria   (NS)  


 


Fine Granular Casts   (NS)  


 


Coarse Granular Casts   (NS)  


 


Blood Type   


 


Gel Antibody Screen   


 


Crossmatch   











[facility] Results Last 24 Hours: 


 Microbiology











 01/09/18 16:00 Stool Occult Blood (ALONSO) - Final





 Stool / Feces - Stool, Formed    NEGATIVE OCCULT BLOOD











Med Orders - Current: 


 Current Medications





Ferrous Gluconate (Ferrous Gluconate)  324 mg PO DAILY [facility]


   Last Admin: 01/10/18 09:06 Dose:  324 mg


Furosemide (Lasix)  20 mg IVPUSH ONETIME PRN


   PRN Reason: Edema


Furosemide (Lasix)  40 mg PO DAILY [facility]


Glimepiride (Glimepiride)  1 mg PO WITHBREAKFAST [facility]


Sodium Chloride (Normal Saline)  250 mls @ 100 mls/hr IV ASDIRECTED [facility]


Insulin Aspart (Novolog)  0 unit SUBCUT QIDACANDBED [facility]


   PRN Reason: Protocol


   Last Admin: 01/10/18 06:45 Dose:  Not Given


Levothyroxine Sodium (Synthroid)  88 mcg PO 0600 [facility]


   Last Admin: 01/10/18 05:57 Dose:  88 mcg


Lisinopril (Prinivil)  20 mg PO DAILY [facility]


Lorazepam (Ativan)  0.5 mg PO Q8H PRN


   PRN Reason: Anxiety


Nebivolol (Bystolic)  10 mg PO DAILY [facility]


Warfarin Sodium (Coumadin)  2.5 mg PO 1600 [facility]


   Last Admin: 01/09/18 16:56 Dose:  2.5 mg


Zolpidem Tartrate (Ambien)  5 mg PO BEDTIME PRN


   PRN Reason: Sleep





Discontinued Medications





Furosemide (Lasix)  20 mg IVPUSH ONETIME ONE


   Stop: 01/09/18 16:32


   Last Admin: 01/09/18 16:57 Dose:  20 mg











- Exam


Physical Findings Comments:: 





General: Alert, Oriented, no distress


HEENT: Conjunctiva Clear normal tear film, oral mucosa is pink and moist. There 

is no scleral icterus.


Neck:Supple, Trachea Midline.  No: JVD


Lungs: Normal Respiratory Effort, Rales (minimal noted in left lower lobe, 

otherwise clear throughout.)


Cardiovascular: Regular Rate, Regular Rhythm, Systolic Murmur (3-4/6 aditya 

radiating throughout precordium).  No: Rubs, Gallop/S3, Gallop/S4


GI/Abdominal Exam: Normal Bowel Sounds, Soft, Non-Tender


Back Exam: Normal Inspection.  No: CVA Tenderness (R), CVA Tenderness (L), 

Muscle Spasm


Extremities: Increased Warmth, Redness (bilateral ankles to mid shins/mild. 

edema 2+ with stasis dermatitis. no weeping or open wounds. Symmetric edema. )


Skin: Skin is improved in coloration no longer pale. No: Wound,


Psychiatric: Labile Mood, Anxious-fear being home alone due to her debility, 

Depressed





- Problem List & Annotations


(1) Complaint of debility and malaise


SNOMED Code(s): 120468718


   Code(s): R53.81 - OTHER MALAISE   Status: Acute   Current Visit: Yes   





(2) Acute on chronic renal failure


SNOMED Code(s): 000768093


   Code(s): N17.9 - ACUTE KIDNEY FAILURE, UNSPECIFIED; N18.9 - CHRONIC KIDNEY 

DISEASE, UNSPECIFIED   Status: Acute   Current Visit: Yes   





(3) Microcytic hypochromic anemia


SNOMED Code(s): 62499504


   Code(s): D50.9 - IRON DEFICIENCY ANEMIA, UNSPECIFIED   Status: Chronic   

Current Visit: Yes   





(4) Weakness of both lower extremities


SNOMED Code(s): 8570569


   Code(s): R29.898 - OTH SYMPTOMS AND SIGNS INVOLVING THE MUSCULOSKELETAL 

SYSTEM   Status: Acute   Current Visit: Yes   





(5) CHF, Congestive heart failure


SNOMED Code(s): 94498097


   Code(s): I50.9 - HEART FAILURE, UNSPECIFIED   Status: Chronic   Current Visit

: Yes   Annotation/Comment:: Admit to In Pt   





(6) Palliative care status


SNOMED Code(s): 814643651


   Code(s): Z51.5 - ENCOUNTER FOR PALLIATIVE CARE   Status: Acute   Current 

Visit: Yes   





(7) Elevated brain natriuretic peptide (BNP) level


SNOMED Code(s): 075873245


   Code(s): R79.89 - OTHER SPECIFIED ABNORMAL FINDINGS OF BLOOD CHEMISTRY   

Status: Acute   Current Visit: Yes   





(8) Venous stasis dermatitis of both lower extremities


SNOMED Code(s): 10912479


   Code(s): I87.2 - VENOUS INSUFFICIENCY (CHRONIC) (PERIPHERAL)   Status: 

Chronic   Current Visit: Yes   





(9) Blood transfusion during current hospitalisation


SNOMED Code(s): 748428758


   Code(s): BXK8420 -    Status: Acute   Current Visit: Yes   Onset Date: 01/09/ 18   Annotation/Comment:: 1 unit given with minimal increase in temp, s/s of 

transfusion rxn. pt tolerated well.    





(10) Hypothyroidism


SNOMED Code(s): 48133438


   Code(s): E03.9 - HYPOTHYROIDISM, UNSPECIFIED   Status: Chronic   Current 

Visit: Yes   





(11) H/O deep venous thrombosis


SNOMED Code(s): 392604216


   Code(s): Z86.718 - PERSONAL HISTORY OF OTHER VENOUS THROMBOSIS AND EMBOLISM 

  Status: Chronic   Current Visit: Yes   





(12) Long term (current) use of anticoagulants


SNOMED Code(s): 329545399


   Code(s): Z79.01 - LONG TERM (CURRENT) USE OF ANTICOAGULANTS   Status: Acute 

  Current Visit: Yes   





(13) Diabetes type 2, controlled


SNOMED Code(s): 05524897


   Code(s): E11.9 - TYPE 2 DIABETES MELLITUS WITHOUT COMPLICATIONS   Status: 

Chronic   Current Visit: Yes   


Qualifiers: 


   Diabetes mellitus complication status: without complication   Diabetes 

mellitus long term insulin use: with long term use   Qualified Code(s): E11.9 - 

Type 2 diabetes mellitus without complications; Z79.4 - Long term (current) use 

of insulin; Z79.4 - Long term (current) use of insulin; Z79.4 - Long term (

current) use of insulin; Z79.4 - Long term (current) use of insulin   





(14) HTN (hypertension)


SNOMED Code(s): 78292207


   Code(s): I10 - ESSENTIAL (PRIMARY) HYPERTENSION   Status: Chronic   Current 

Visit: Yes   


Qualifiers: 


   Hypertension type: essential hypertension   Qualified Code(s): I10 - 

Essential (primary) hypertension   





- Problem List Review


Problem List Initiated/Reviewed/Updated: Yes





- My Orders


Last 24 Hours: 


My Active Orders





01/09/18 14:58


Patient Status [ADT] Routine 


Cardiac Education [RC] Click to Edit 


Head of Bed Elevation [RC] ASDIRECTED 


Height and Weight [RC] 07 


Orthostatic Vital Signs [RC] ASDIRECTED 


Oxygen Therapy [RC] PRN 


Up With Assistance [RC] ASDIRECTED 


VTE/DVT Education [RC] Per Unit Routine 


Vital Signs [RC] 08,12,16,20,00 


Consult to  [CONS] Routine 


OT Evaluation and Treatment [CONS] Routine 


PT Evaluation and Treatment [CONS] Routine 


Zolpidem [Ambien]   5 mg PO BEDTIME PRN 


CHF Questionnaire [COMM] Routine 





01/09/18 15:01


Intake and Output [RC] 06,10,16,18,22,02 





01/09/18 15:02


Cardiac Monitoring [RC] 08,16,00 





01/09/18 16:00


Warfarin [Coumadin]   2.5 mg PO 1600 





01/09/18 16:26


Transfuse PRBC [Transfuse Red Blood Cells] [COMM] Routine 





01/09/18 16:28


ACE Bandage [Elastic Wrap] [OM.PC] Routine 





01/09/18 16:29


Bedrest Bedside Commode [RC] ASDIRECTED 





01/09/18 16:30


Sodium Chloride 0.9% [Normal Saline] 250 ml IV ASDIRECTED 





01/09/18 16:33


LORazepam [Ativan]   0.5 mg PO Q8H PRN 





01/09/18 16:34


Accu Check [Blood Glucose Check, Bedside] [RC] WITHMEALSANDBED 





01/09/18 17:08


Code Status [Resuscitation Status] Routine 





01/09/18 17:10


Furosemide [Lasix]   20 mg IVPUSH ONETIME PRN 





01/09/18 17:30


Insulin Aspart [NovoLOG]   See Protocol  SUBCUT QIDACANDBED 





01/09/18 Dinner


2 Gram Sodium Diet [DIET] 


Fluid Restriction [DIET] 





01/10/18 06:00


Levothyroxine [Synthroid]   88 mcg PO 0600 





01/10/18 08:00


Glimepiride   1 mg PO WITHBREAKFAST 





01/10/18 09:00


Ferrous Gluconate   324 mg PO DAILY 





01/10/18 10:00


Furosemide [Lasix]   40 mg PO DAILY 


Lisinopril [Prinivil]   20 mg PO DAILY 


Nebivolol [Bystolic]   10 mg PO DAILY 





01/10/18 Lunch


Consistent Carbohydrate Diet [DIET] 





01/11/18 08:42


INR,PT,PROTHROMBIN TIME [COAG] DAILY 














- Assessment


Assessment:: 





please see above








- Plan


Plan:: 





dose adjusting/discontinuing home meds due to renal function and consistent 

weakness with intermittent low bp. stopping metformin and starting glimiperide 

low dose. carb consistent diet added to low sodium and fluid restriction. 

continue accu checks. cutting out amlodipine due to venous stasis. cutting out 

hctz due to electrolyte and cutting loop diuretic lasix in half as she is 

making good urine. her bystolic is also being cut in half as well as this is 

add a high dose. will get a check of her b12 and folate due to her chronic 

metformen use. monitor INR closely with med changes. blood transfusion brought 

hgb up to > 9 mg/dL. Only 1 unit given. continue ACE wraps and elevation to 

legs. caution not to overload the heart. she looks clinically looking better 

and weight is down. will continue to monitor strict in/outs. accurate weight 

measurements. chronic venous stasis dermatitis non infective. will monitor. 

disposition is guarded. she is palliative care status. no longer safe in her 

own home, and will discuss nursing home assessment during stay. will have pt/ot 

assess her tomorrow afternoon. will follow glucose levels and INRs as well. 

continuous telemetry. Anticipate inpatient stay additional 48-72 hours pending 

response to above.

## 2018-01-11 NOTE — PCM.PN
- General Info


Date of Service: 01/11/18


Subjective Update: 





Pleasant 83-year-old female awake lying in bed. Tells me she feels stronger 

after getting blood. Still feels weak and cautious. states her legs feel better 

as the swelling has gone down and the redness has improved. she did have an 

episode of hypoglycemia over night and was given juice which helped 

immediately. otherwise she has not concerns. we discussed the medication 

changes that have been made and discontinued. she is worried that her pharmacy 

has already received her 3 months supply of meds and wonders if her son should 

pick them up. I told her no, as she is on different doses now and this will be 

listed on her discharge she. She would like "Ashley" at her pharmacy to know this. 





 She denies headache, tinnitus, blurred vision, nasal congestion or sore throat

, no coughing, shortness of breath, chest pain or pressure, abdominal pain, or 

worsening pain of the lower extremities. She is tolerating the Ace wraps quite 

well. She has been getting up to the restroom and has made good urine output. 

She is tolerating her diet. Is less anxious  No overnight events on telemetry. 

Nursing without concerns.





- Review of Systems


Systems Review Comment:: 





Please see subjective for pertinent positives and negatives





- Patient Data


Vitals - Most Recent: 


 Last Vital Signs











Temp  97.5 F   01/11/18 05:05


 


Pulse  70   01/11/18 09:08


 


Resp  16   01/11/18 05:05


 


BP  127/63   01/11/18 09:11


 


Pulse Ox  96   01/11/18 05:05








 





Orthostatic Blood Pressure [     128/60


Standing]                        


Orthostatic Blood Pressure [     120/56


Sitting]                         


Orthostatic Blood Pressure [     110/58


Supine]                          








Weight - Most Recent: 86.183 kg


I&O - Last 24 Hours: 


 


 Intake & Output











 01/10/18 01/11/18 01/11/18





 22:59 06:59 14:59


 


Intake Total 400 200 


 


Output Total 700 0 


 


Balance -300 200 


 


Intake:   


 


  Intake, Oral Amount 400 200 


 


    Oral Fluids 400 200 


 


  Intake, IV Amount  0 


 


    Normal Saline 250 ML @  0 





    100 mls/hr IV ASDIRECTED   





    SILVIO Rx#:[id number]   


 


Output:   


 


  Output, Emesis Amount  0 


 


  Output, Urine Amount 700 0 


 


Other:   


 


  Dinner Percent Consumed 100  


 


  Total, Intake Amount 100 200 


 


  Number of Bowel Movements  0 


 


  Number of Voids  0 


 


  Total, Output Amount 300 0 














Lab Results Last 24 Hours: 


 


 Laboratory Tests











  01/09/18 01/09/18 01/09/18 Range/Units





  12:30 12:30 12:30 


 


WBC  4.0 L    (4.5-12.0)  X10-3/uL


 


RBC  3.20 L    (3.23-5.20)  x10(6)uL


 


Hgb  8.4 L    (11.5-15.5)  g/dL


 


Hct  26.3 L    (30.0-51.3)  %


 


MCV  82.0    (80-96)  fL


 


MCH  26.2 L    (27.7-33.6)  pg


 


MCHC  31.9 L    (32.2-35.4)  g/dL


 


RDW  15.4    (11.5-15.5)  %


 


Plt Count  197    (125-369)  X10(3)uL


 


MPV  7.6    (7.4-10.4)  fL


 


Neut % (Auto)  63.5    (46-82)  %


 


Lymph % (Auto)  16.1    (13-37)  %


 


Mono % (Auto)  19.3 H    (4-12)  %


 


Eos % (Auto)  1    (1.0-5.0)  %


 


Baso % (Auto)  0    (0-2)  %


 


Neut # (Auto)  2.6    (1.6-8.3)  #


 


Lymph # (Auto)  0.6    (0.6-5.0)  #


 


Mono # (Auto)  0.8    (0.0-1.3)  #


 


Eos # (Auto)  0.0    (0.0-0.8)  #


 


Baso # (Auto)  0.0    (0.0-0.2)  #


 


PT   21.1 H   (8.7-11.1)  


 


INR   2.06 H   (0.89-1.13)  


 


Sodium    133 L  (135-145)  mmol/L


 


Potassium    3.9  (3.5-5.3)  mmol/L


 


Chloride    97 L  (100-110)  mmol/L


 


Carbon Dioxide    26  (21-32)  mmol/L


 


BUN    37 H  (7-18)  mg/dL


 


Creatinine    1.4 H  (0.55-1.02)  mg/dL


 


Est Cr Clr Drug Dosing    26.29  mL/min


 


Estimated GFR (MDRD)    36 L  (>60)  


 


BUN/Creatinine Ratio    26.4 H  (9-20)  


 


Glucose    216 H  ()  mg/dL


 


POC Glucose     ()  mg/dL


 


Hemoglobin A1c     (4.5-6.2)  %


 


Calcium    8.7  (8.6-10.2)  mg/dL


 


Total Bilirubin    0.5  (0.1-1.3)  mg/dL


 


AST    30 H  (5-25)  IU/L


 


ALT    18  (12-36)  U/L


 


Alkaline Phosphatase    62  ()  IU/L


 


NT-Pro-B Natriuret Pep     (<=450)  pg/mL


 


Total Protein    7.1  (6.0-8.0)  g/dL


 


Albumin    2.8 L  (3.2-4.6)  g/dL


 


Globulin    4.3  g/dL


 


Albumin/Globulin Ratio    0.7  


 


Vitamin B12     (193-986)  pg/mL


 


TSH, Ultra Sensitive     (0.36-3.74)  IU/mL


 


Urine Color     (YELLOW)  


 


Urine Appearance     (CLEAR)  


 


Urine pH     (5.0-6.5)  


 


Ur Specific Gravity     (1.010-1.025)  


 


Urine Protein     (NEGATIVE)  mg/dL


 


Urine Glucose (UA)     (NEGATIVE)  mg/dL


 


Urine Ketones     (NEGATIVE)  mg/dL


 


Urine Occult Blood     (NEGATIVE)  


 


Urine Nitrite     (NEGATIVE)  


 


Urine Bilirubin     (NEGATIVE)  


 


Urine Urobilinogen     (NEGATIVE)  mg/dL


 


Ur Leukocyte Esterase     (NEGATIVE)  


 


Urine RBC     (0)  


 


Urine WBC     (0)  


 


Ur Squamous Epith Cells     (NS,R,O)  


 


Urine Bacteria     (NS)  


 


Fine Granular Casts     (NS)  


 


Coarse Granular Casts     (NS)  


 


Blood Type     


 


Gel Antibody Screen     


 


Crossmatch     














  01/09/18 01/09/18 01/09/18 Range/Units





  12:30 12:30 12:30 


 


WBC     (4.5-12.0)  X10-3/uL


 


RBC     (3.23-5.20)  x10(6)uL


 


Hgb     (11.5-15.5)  g/dL


 


Hct     (30.0-51.3)  %


 


MCV     (80-96)  fL


 


MCH     (27.7-33.6)  pg


 


MCHC     (32.2-35.4)  g/dL


 


RDW     (11.5-15.5)  %


 


Plt Count     (125-369)  X10(3)uL


 


MPV     (7.4-10.4)  fL


 


Neut % (Auto)     (46-82)  %


 


Lymph % (Auto)     (13-37)  %


 


Mono % (Auto)     (4-12)  %


 


Eos % (Auto)     (1.0-5.0)  %


 


Baso % (Auto)     (0-2)  %


 


Neut # (Auto)     (1.6-8.3)  #


 


Lymph # (Auto)     (0.6-5.0)  #


 


Mono # (Auto)     (0.0-1.3)  #


 


Eos # (Auto)     (0.0-0.8)  #


 


Baso # (Auto)     (0.0-0.2)  #


 


PT     (8.7-11.1)  


 


INR     (0.89-1.13)  


 


Sodium     (135-145)  mmol/L


 


Potassium     (3.5-5.3)  mmol/L


 


Chloride     (100-110)  mmol/L


 


Carbon Dioxide     (21-32)  mmol/L


 


BUN     (7-18)  mg/dL


 


Creatinine     (0.55-1.02)  mg/dL


 


Est Cr Clr Drug Dosing     mL/min


 


Estimated GFR (MDRD)     (>60)  


 


BUN/Creatinine Ratio     (9-20)  


 


Glucose     ()  mg/dL


 


POC Glucose     ()  mg/dL


 


Hemoglobin A1c   7.2 H   (4.5-6.2)  %


 


Calcium     (8.6-10.2)  mg/dL


 


Total Bilirubin     (0.1-1.3)  mg/dL


 


AST     (5-25)  IU/L


 


ALT     (12-36)  U/L


 


Alkaline Phosphatase     ()  IU/L


 


NT-Pro-B Natriuret Pep  8130 H*    (<=450)  pg/mL


 


Total Protein     (6.0-8.0)  g/dL


 


Albumin     (3.2-4.6)  g/dL


 


Globulin     g/dL


 


Albumin/Globulin Ratio     


 


Vitamin B12     (193-986)  pg/mL


 


TSH, Ultra Sensitive     (0.36-3.74)  IU/mL


 


Urine Color     (YELLOW)  


 


Urine Appearance     (CLEAR)  


 


Urine pH     (5.0-6.5)  


 


Ur Specific Gravity     (1.010-1.025)  


 


Urine Protein     (NEGATIVE)  mg/dL


 


Urine Glucose (UA)     (NEGATIVE)  mg/dL


 


Urine Ketones     (NEGATIVE)  mg/dL


 


Urine Occult Blood     (NEGATIVE)  


 


Urine Nitrite     (NEGATIVE)  


 


Urine Bilirubin     (NEGATIVE)  


 


Urine Urobilinogen     (NEGATIVE)  mg/dL


 


Ur Leukocyte Esterase     (NEGATIVE)  


 


Urine RBC     (0)  


 


Urine WBC     (0)  


 


Ur Squamous Epith Cells     (NS,R,O)  


 


Urine Bacteria     (NS)  


 


Fine Granular Casts     (NS)  


 


Coarse Granular Casts     (NS)  


 


Blood Type    A POSITIVE  


 


Gel Antibody Screen    Negative  


 


Crossmatch    See Detail  














  01/09/18 01/09/18 01/09/18 Range/Units





  12:30 13:26 17:08 


 


WBC     (4.5-12.0)  X10-3/uL


 


RBC     (3.23-5.20)  x10(6)uL


 


Hgb     (11.5-15.5)  g/dL


 


Hct     (30.0-51.3)  %


 


MCV     (80-96)  fL


 


MCH     (27.7-33.6)  pg


 


MCHC     (32.2-35.4)  g/dL


 


RDW     (11.5-15.5)  %


 


Plt Count     (125-369)  X10(3)uL


 


MPV     (7.4-10.4)  fL


 


Neut % (Auto)     (46-82)  %


 


Lymph % (Auto)     (13-37)  %


 


Mono % (Auto)     (4-12)  %


 


Eos % (Auto)     (1.0-5.0)  %


 


Baso % (Auto)     (0-2)  %


 


Neut # (Auto)     (1.6-8.3)  #


 


Lymph # (Auto)     (0.6-5.0)  #


 


Mono # (Auto)     (0.0-1.3)  #


 


Eos # (Auto)     (0.0-0.8)  #


 


Baso # (Auto)     (0.0-0.2)  #


 


PT     (8.7-11.1)  


 


INR     (0.89-1.13)  


 


Sodium     (135-145)  mmol/L


 


Potassium     (3.5-5.3)  mmol/L


 


Chloride     (100-110)  mmol/L


 


Carbon Dioxide     (21-32)  mmol/L


 


BUN     (7-18)  mg/dL


 


Creatinine     (0.55-1.02)  mg/dL


 


Est Cr Clr Drug Dosing     mL/min


 


Estimated GFR (MDRD)     (>60)  


 


BUN/Creatinine Ratio     (9-20)  


 


Glucose     ()  mg/dL


 


POC Glucose    184 H  ()  mg/dL


 


Hemoglobin A1c     (4.5-6.2)  %


 


Calcium     (8.6-10.2)  mg/dL


 


Total Bilirubin     (0.1-1.3)  mg/dL


 


AST     (5-25)  IU/L


 


ALT     (12-36)  U/L


 


Alkaline Phosphatase     ()  IU/L


 


NT-Pro-B Natriuret Pep     (<=450)  pg/mL


 


Total Protein     (6.0-8.0)  g/dL


 


Albumin     (3.2-4.6)  g/dL


 


Globulin     g/dL


 


Albumin/Globulin Ratio     


 


Vitamin B12     (193-986)  pg/mL


 


TSH, Ultra Sensitive  6.32 H    (0.36-3.74)  IU/mL


 


Urine Color   Yellow   (YELLOW)  


 


Urine Appearance   Clear   (CLEAR)  


 


Urine pH   5.0   (5.0-6.5)  


 


Ur Specific Gravity   1.010   (1.010-1.025)  


 


Urine Protein   Negative   (NEGATIVE)  mg/dL


 


Urine Glucose (UA)   Normal   (NEGATIVE)  mg/dL


 


Urine Ketones   Negative   (NEGATIVE)  mg/dL


 


Urine Occult Blood   Negative   (NEGATIVE)  


 


Urine Nitrite   Negative   (NEGATIVE)  


 


Urine Bilirubin   Negative   (NEGATIVE)  


 


Urine Urobilinogen   Normal   (NEGATIVE)  mg/dL


 


Ur Leukocyte Esterase   Negative   (NEGATIVE)  


 


Urine RBC   0-5   (0)  


 


Urine WBC   0-5   (0)  


 


Ur Squamous Epith Cells   Few H   (NS,R,O)  


 


Urine Bacteria   Moderate H   (NS)  


 


Fine Granular Casts   Few H   (NS)  


 


Coarse Granular Casts   Few H   (NS)  


 


Blood Type     


 


Gel Antibody Screen     


 


Crossmatch     














  01/09/18 01/10/18 01/10/18 Range/Units





  20:52 06:01 09:15 


 


WBC    2.9 L  (4.5-12.0)  X10-3/uL


 


RBC    3.55  (3.23-5.20)  x10(6)uL


 


Hgb    9.3 L  (11.5-15.5)  g/dL


 


Hct    29.3 L  (30.0-51.3)  %


 


MCV    82.6  (80-96)  fL


 


MCH    26.3 L  (27.7-33.6)  pg


 


MCHC    31.8 L  (32.2-35.4)  g/dL


 


RDW    15.2  (11.5-15.5)  %


 


Plt Count    178  (125-369)  X10(3)uL


 


MPV     (7.4-10.4)  fL


 


Neut % (Auto)     (46-82)  %


 


Lymph % (Auto)     (13-37)  %


 


Mono % (Auto)     (4-12)  %


 


Eos % (Auto)     (1.0-5.0)  %


 


Baso % (Auto)     (0-2)  %


 


Neut # (Auto)     (1.6-8.3)  #


 


Lymph # (Auto)     (0.6-5.0)  #


 


Mono # (Auto)     (0.0-1.3)  #


 


Eos # (Auto)     (0.0-0.8)  #


 


Baso # (Auto)     (0.0-0.2)  #


 


PT     (8.7-11.1)  


 


INR     (0.89-1.13)  


 


Sodium     (135-145)  mmol/L


 


Potassium     (3.5-5.3)  mmol/L


 


Chloride     (100-110)  mmol/L


 


Carbon Dioxide     (21-32)  mmol/L


 


BUN     (7-18)  mg/dL


 


Creatinine     (0.55-1.02)  mg/dL


 


Est Cr Clr Drug Dosing     mL/min


 


Estimated GFR (MDRD)     (>60)  


 


BUN/Creatinine Ratio     (9-20)  


 


Glucose     ()  mg/dL


 


POC Glucose  201 H  111  D   ()  mg/dL


 


Hemoglobin A1c     (4.5-6.2)  %


 


Calcium     (8.6-10.2)  mg/dL


 


Total Bilirubin     (0.1-1.3)  mg/dL


 


AST     (5-25)  IU/L


 


ALT     (12-36)  U/L


 


Alkaline Phosphatase     ()  IU/L


 


NT-Pro-B Natriuret Pep     (<=450)  pg/mL


 


Total Protein     (6.0-8.0)  g/dL


 


Albumin     (3.2-4.6)  g/dL


 


Globulin     g/dL


 


Albumin/Globulin Ratio     


 


Vitamin B12     (193-986)  pg/mL


 


TSH, Ultra Sensitive     (0.36-3.74)  IU/mL


 


Urine Color     (YELLOW)  


 


Urine Appearance     (CLEAR)  


 


Urine pH     (5.0-6.5)  


 


Ur Specific Gravity     (1.010-1.025)  


 


Urine Protein     (NEGATIVE)  mg/dL


 


Urine Glucose (UA)     (NEGATIVE)  mg/dL


 


Urine Ketones     (NEGATIVE)  mg/dL


 


Urine Occult Blood     (NEGATIVE)  


 


Urine Nitrite     (NEGATIVE)  


 


Urine Bilirubin     (NEGATIVE)  


 


Urine Urobilinogen     (NEGATIVE)  mg/dL


 


Ur Leukocyte Esterase     (NEGATIVE)  


 


Urine RBC     (0)  


 


Urine WBC     (0)  


 


Ur Squamous Epith Cells     (NS,R,O)  


 


Urine Bacteria     (NS)  


 


Fine Granular Casts     (NS)  


 


Coarse Granular Casts     (NS)  


 


Blood Type     


 


Gel Antibody Screen     


 


Crossmatch     














  01/10/18 01/10/18 01/10/18 Range/Units





  09:15 09:15 09:15 


 


WBC     (4.5-12.0)  X10-3/uL


 


RBC     (3.23-5.20)  x10(6)uL


 


Hgb     (11.5-15.5)  g/dL


 


Hct     (30.0-51.3)  %


 


MCV     (80-96)  fL


 


MCH     (27.7-33.6)  pg


 


MCHC     (32.2-35.4)  g/dL


 


RDW     (11.5-15.5)  %


 


Plt Count     (125-369)  X10(3)uL


 


MPV     (7.4-10.4)  fL


 


Neut % (Auto)     (46-82)  %


 


Lymph % (Auto)     (13-37)  %


 


Mono % (Auto)     (4-12)  %


 


Eos % (Auto)     (1.0-5.0)  %


 


Baso % (Auto)     (0-2)  %


 


Neut # (Auto)     (1.6-8.3)  #


 


Lymph # (Auto)     (0.6-5.0)  #


 


Mono # (Auto)     (0.0-1.3)  #


 


Eos # (Auto)     (0.0-0.8)  #


 


Baso # (Auto)     (0.0-0.2)  #


 


PT   22.4 H   (8.7-11.1)  


 


INR   2.18 H   (0.89-1.13)  


 


Sodium  135    (135-145)  mmol/L


 


Potassium  3.4 L    (3.5-5.3)  mmol/L


 


Chloride  98 L    (100-110)  mmol/L


 


Carbon Dioxide  28    (21-32)  mmol/L


 


BUN  28 H    (7-18)  mg/dL


 


Creatinine  1.3 H    (0.55-1.02)  mg/dL


 


Est Cr Clr Drug Dosing  28.31    mL/min


 


Estimated GFR (MDRD)  39 L    (>60)  


 


BUN/Creatinine Ratio  21.5 H    (9-20)  


 


Glucose  236 H    ()  mg/dL


 


POC Glucose     ()  mg/dL


 


Hemoglobin A1c     (4.5-6.2)  %


 


Calcium  8.9    (8.6-10.2)  mg/dL


 


Total Bilirubin     (0.1-1.3)  mg/dL


 


AST     (5-25)  IU/L


 


ALT     (12-36)  U/L


 


Alkaline Phosphatase     ()  IU/L


 


NT-Pro-B Natriuret Pep    07150 H*  (<=450)  pg/mL


 


Total Protein     (6.0-8.0)  g/dL


 


Albumin     (3.2-4.6)  g/dL


 


Globulin     g/dL


 


Albumin/Globulin Ratio     


 


Vitamin B12     (193-986)  pg/mL


 


TSH, Ultra Sensitive     (0.36-3.74)  IU/mL


 


Urine Color     (YELLOW)  


 


Urine Appearance     (CLEAR)  


 


Urine pH     (5.0-6.5)  


 


Ur Specific Gravity     (1.010-1.025)  


 


Urine Protein     (NEGATIVE)  mg/dL


 


Urine Glucose (UA)     (NEGATIVE)  mg/dL


 


Urine Ketones     (NEGATIVE)  mg/dL


 


Urine Occult Blood     (NEGATIVE)  


 


Urine Nitrite     (NEGATIVE)  


 


Urine Bilirubin     (NEGATIVE)  


 


Urine Urobilinogen     (NEGATIVE)  mg/dL


 


Ur Leukocyte Esterase     (NEGATIVE)  


 


Urine RBC     (0)  


 


Urine WBC     (0)  


 


Ur Squamous Epith Cells     (NS,R,O)  


 


Urine Bacteria     (NS)  


 


Fine Granular Casts     (NS)  


 


Coarse Granular Casts     (NS)  


 


Blood Type     


 


Gel Antibody Screen     


 


Crossmatch     














  01/10/18 01/10/18 01/10/18 Range/Units





  09:15 11:23 17:09 


 


WBC     (4.5-12.0)  X10-3/uL


 


RBC     (3.23-5.20)  x10(6)uL


 


Hgb     (11.5-15.5)  g/dL


 


Hct     (30.0-51.3)  %


 


MCV     (80-96)  fL


 


MCH     (27.7-33.6)  pg


 


MCHC     (32.2-35.4)  g/dL


 


RDW     (11.5-15.5)  %


 


Plt Count     (125-369)  X10(3)uL


 


MPV     (7.4-10.4)  fL


 


Neut % (Auto)     (46-82)  %


 


Lymph % (Auto)     (13-37)  %


 


Mono % (Auto)     (4-12)  %


 


Eos % (Auto)     (1.0-5.0)  %


 


Baso % (Auto)     (0-2)  %


 


Neut # (Auto)     (1.6-8.3)  #


 


Lymph # (Auto)     (0.6-5.0)  #


 


Mono # (Auto)     (0.0-1.3)  #


 


Eos # (Auto)     (0.0-0.8)  #


 


Baso # (Auto)     (0.0-0.2)  #


 


PT     (8.7-11.1)  


 


INR     (0.89-1.13)  


 


Sodium     (135-145)  mmol/L


 


Potassium     (3.5-5.3)  mmol/L


 


Chloride     (100-110)  mmol/L


 


Carbon Dioxide     (21-32)  mmol/L


 


BUN     (7-18)  mg/dL


 


Creatinine     (0.55-1.02)  mg/dL


 


Est Cr Clr Drug Dosing     mL/min


 


Estimated GFR (MDRD)     (>60)  


 


BUN/Creatinine Ratio     (9-20)  


 


Glucose     ()  mg/dL


 


POC Glucose   191 H D  65 L D  ()  mg/dL


 


Hemoglobin A1c     (4.5-6.2)  %


 


Calcium     (8.6-10.2)  mg/dL


 


Total Bilirubin     (0.1-1.3)  mg/dL


 


AST     (5-25)  IU/L


 


ALT     (12-36)  U/L


 


Alkaline Phosphatase     ()  IU/L


 


NT-Pro-B Natriuret Pep     (<=450)  pg/mL


 


Total Protein     (6.0-8.0)  g/dL


 


Albumin     (3.2-4.6)  g/dL


 


Globulin     g/dL


 


Albumin/Globulin Ratio     


 


Vitamin B12  685    (193-986)  pg/mL


 


TSH, Ultra Sensitive     (0.36-3.74)  IU/mL


 


Urine Color     (YELLOW)  


 


Urine Appearance     (CLEAR)  


 


Urine pH     (5.0-6.5)  


 


Ur Specific Gravity     (1.010-1.025)  


 


Urine Protein     (NEGATIVE)  mg/dL


 


Urine Glucose (UA)     (NEGATIVE)  mg/dL


 


Urine Ketones     (NEGATIVE)  mg/dL


 


Urine Occult Blood     (NEGATIVE)  


 


Urine Nitrite     (NEGATIVE)  


 


Urine Bilirubin     (NEGATIVE)  


 


Urine Urobilinogen     (NEGATIVE)  mg/dL


 


Ur Leukocyte Esterase     (NEGATIVE)  


 


Urine RBC     (0)  


 


Urine WBC     (0)  


 


Ur Squamous Epith Cells     (NS,R,O)  


 


Urine Bacteria     (NS)  


 


Fine Granular Casts     (NS)  


 


Coarse Granular Casts     (NS)  


 


Blood Type     


 


Gel Antibody Screen     


 


Crossmatch     














  01/10/18 01/11/18 01/11/18 Range/Units





  20:51 05:58 06:15 


 


WBC     (4.5-12.0)  X10-3/uL


 


RBC     (3.23-5.20)  x10(6)uL


 


Hgb     (11.5-15.5)  g/dL


 


Hct     (30.0-51.3)  %


 


MCV     (80-96)  fL


 


MCH     (27.7-33.6)  pg


 


MCHC     (32.2-35.4)  g/dL


 


RDW     (11.5-15.5)  %


 


Plt Count     (125-369)  X10(3)uL


 


MPV     (7.4-10.4)  fL


 


Neut % (Auto)     (46-82)  %


 


Lymph % (Auto)     (13-37)  %


 


Mono % (Auto)     (4-12)  %


 


Eos % (Auto)     (1.0-5.0)  %


 


Baso % (Auto)     (0-2)  %


 


Neut # (Auto)     (1.6-8.3)  #


 


Lymph # (Auto)     (0.6-5.0)  #


 


Mono # (Auto)     (0.0-1.3)  #


 


Eos # (Auto)     (0.0-0.8)  #


 


Baso # (Auto)     (0.0-0.2)  #


 


PT    25.7 H  (8.7-11.1)  


 


INR    2.49 H  (0.89-1.13)  


 


Sodium     (135-145)  mmol/L


 


Potassium     (3.5-5.3)  mmol/L


 


Chloride     (100-110)  mmol/L


 


Carbon Dioxide     (21-32)  mmol/L


 


BUN     (7-18)  mg/dL


 


Creatinine     (0.55-1.02)  mg/dL


 


Est Cr Clr Drug Dosing     mL/min


 


Estimated GFR (MDRD)     (>60)  


 


BUN/Creatinine Ratio     (9-20)  


 


Glucose     ()  mg/dL


 


POC Glucose  94  53 L   ()  mg/dL


 


Hemoglobin A1c     (4.5-6.2)  %


 


Calcium     (8.6-10.2)  mg/dL


 


Total Bilirubin     (0.1-1.3)  mg/dL


 


AST     (5-25)  IU/L


 


ALT     (12-36)  U/L


 


Alkaline Phosphatase     ()  IU/L


 


NT-Pro-B Natriuret Pep     (<=450)  pg/mL


 


Total Protein     (6.0-8.0)  g/dL


 


Albumin     (3.2-4.6)  g/dL


 


Globulin     g/dL


 


Albumin/Globulin Ratio     


 


Vitamin B12     (193-986)  pg/mL


 


TSH, Ultra Sensitive     (0.36-3.74)  IU/mL


 


Urine Color     (YELLOW)  


 


Urine Appearance     (CLEAR)  


 


Urine pH     (5.0-6.5)  


 


Ur Specific Gravity     (1.010-1.025)  


 


Urine Protein     (NEGATIVE)  mg/dL


 


Urine Glucose (UA)     (NEGATIVE)  mg/dL


 


Urine Ketones     (NEGATIVE)  mg/dL


 


Urine Occult Blood     (NEGATIVE)  


 


Urine Nitrite     (NEGATIVE)  


 


Urine Bilirubin     (NEGATIVE)  


 


Urine Urobilinogen     (NEGATIVE)  mg/dL


 


Ur Leukocyte Esterase     (NEGATIVE)  


 


Urine RBC     (0)  


 


Urine WBC     (0)  


 


Ur Squamous Epith Cells     (NS,R,O)  


 


Urine Bacteria     (NS)  


 


Fine Granular Casts     (NS)  


 


Coarse Granular Casts     (NS)  


 


Blood Type     


 


Gel Antibody Screen     


 


Crossmatch     














  01/11/18 Range/Units





  06:45 


 


WBC   (4.5-12.0)  X10-3/uL


 


RBC   (3.23-5.20)  x10(6)uL


 


Hgb   (11.5-15.5)  g/dL


 


Hct   (30.0-51.3)  %


 


MCV   (80-96)  fL


 


MCH   (27.7-33.6)  pg


 


MCHC   (32.2-35.4)  g/dL


 


RDW   (11.5-15.5)  %


 


Plt Count   (125-369)  X10(3)uL


 


MPV   (7.4-10.4)  fL


 


Neut % (Auto)   (46-82)  %


 


Lymph % (Auto)   (13-37)  %


 


Mono % (Auto)   (4-12)  %


 


Eos % (Auto)   (1.0-5.0)  %


 


Baso % (Auto)   (0-2)  %


 


Neut # (Auto)   (1.6-8.3)  #


 


Lymph # (Auto)   (0.6-5.0)  #


 


Mono # (Auto)   (0.0-1.3)  #


 


Eos # (Auto)   (0.0-0.8)  #


 


Baso # (Auto)   (0.0-0.2)  #


 


PT   (8.7-11.1)  


 


INR   (0.89-1.13)  


 


Sodium   (135-145)  mmol/L


 


Potassium   (3.5-5.3)  mmol/L


 


Chloride   (100-110)  mmol/L


 


Carbon Dioxide   (21-32)  mmol/L


 


BUN   (7-18)  mg/dL


 


Creatinine   (0.55-1.02)  mg/dL


 


Est Cr Clr Drug Dosing   mL/min


 


Estimated GFR (MDRD)   (>60)  


 


BUN/Creatinine Ratio   (9-20)  


 


Glucose   ()  mg/dL


 


POC Glucose  112  ()  mg/dL


 


Hemoglobin A1c   (4.5-6.2)  %


 


Calcium   (8.6-10.2)  mg/dL


 


Total Bilirubin   (0.1-1.3)  mg/dL


 


AST   (5-25)  IU/L


 


ALT   (12-36)  U/L


 


Alkaline Phosphatase   ()  IU/L


 


NT-Pro-B Natriuret Pep   (<=450)  pg/mL


 


Total Protein   (6.0-8.0)  g/dL


 


Albumin   (3.2-4.6)  g/dL


 


Globulin   g/dL


 


Albumin/Globulin Ratio   


 


Vitamin B12   (193-986)  pg/mL


 


TSH, Ultra Sensitive   (0.36-3.74)  IU/mL


 


Urine Color   (YELLOW)  


 


Urine Appearance   (CLEAR)  


 


Urine pH   (5.0-6.5)  


 


Ur Specific Gravity   (1.010-1.025)  


 


Urine Protein   (NEGATIVE)  mg/dL


 


Urine Glucose (UA)   (NEGATIVE)  mg/dL


 


Urine Ketones   (NEGATIVE)  mg/dL


 


Urine Occult Blood   (NEGATIVE)  


 


Urine Nitrite   (NEGATIVE)  


 


Urine Bilirubin   (NEGATIVE)  


 


Urine Urobilinogen   (NEGATIVE)  mg/dL


 


Ur Leukocyte Esterase   (NEGATIVE)  


 


Urine RBC   (0)  


 


Urine WBC   (0)  


 


Ur Squamous Epith Cells   (NS,R,O)  


 


Urine Bacteria   (NS)  


 


Fine Granular Casts   (NS)  


 


Coarse Granular Casts   (NS)  


 


Blood Type   


 


Gel Antibody Screen   


 


Crossmatch   














Med Orders - Current: 


 Current Medications





Hydrocodone Bitart/Acetaminophen (Norco 325-5 Mg)  1 tab PO Q6H PRN


   PRN Reason: right should pain


Ferrous Gluconate (Ferrous Gluconate)  324 mg PO DAILY [facility]


   Last Admin: 01/11/18 09:11 Dose:  324 mg


Furosemide (Lasix)  20 mg IVPUSH ONETIME PRN


   PRN Reason: Edema


Furosemide (Lasix)  40 mg PO DAILY [facility]


   Last Admin: 01/11/18 09:11 Dose:  40 mg


Glimepiride (Amaryl)  1 mg PO WITHBREAKFAST [facility]


   Last Admin: 01/11/18 09:08 Dose:  1 mg


Sodium Chloride (Normal Saline)  250 mls @ 100 mls/hr IV ASDIRECTED [facility]


Insulin Aspart (Novolog)  0 unit SUBCUT QIDACANDBED [facility]


   PRN Reason: Protocol


   Last Admin: 01/11/18 07:54 Dose:  Not Given


Levothyroxine Sodium (Synthroid)  88 mcg PO 0600 [facility]


   Last Admin: 01/11/18 05:08 Dose:  88 mcg


Lisinopril (Prinivil)  20 mg PO DAILY [facility]


   Last Admin: 01/11/18 09:11 Dose:  20 mg


Lorazepam (Ativan)  0.5 mg PO Q8H PRN


   PRN Reason: Anxiety


Nebivolol (Bystolic)  10 mg PO DAILY [facility]


   Last Admin: 01/11/18 09:08 Dose:  10 mg


Warfarin Sodium (Coumadin)  2.5 mg PO 1600 [facility]


   Last Admin: 01/10/18 16:27 Dose:  2.5 mg


Zolpidem Tartrate (Ambien)  5 mg PO BEDTIME PRN


   PRN Reason: Sleep





Discontinued Medications





Furosemide (Lasix)  20 mg IVPUSH ONETIME ONE


   Stop: 01/09/18 16:32


   Last Admin: 01/09/18 16:57 Dose:  20 mg


Glimepiride (Glimepiride)  1 mg PO WITHBREAKFAST [facility]


   Last Admin: 01/10/18 12:44 Dose:  1 mg











- Exam


Physical Findings Comments:: 





General: Alert, Oriented, no distress, smiling making good eye contact. 


HEENT: Conjunctiva Clear normal tear film, oral mucosa is pink and moist. There 

is no scleral icterus.


Neck:Supple, Trachea Midline.  No: JVD


Lungs: Normal Respiratory Effort, clear throughout


Cardiovascular: Regular Rate, Regular Rhythm, Systolic Murmur (3-4/6 aditya 

radiating throughout precordium).  No: Rubs, Gallop/S3, Gallop/S4


GI/Abdominal Exam: Normal Bowel Sounds, Soft, Non-Tender


Back Exam: Normal Inspection.  No: CVA Tenderness (R), CVA Tenderness (L), 

Muscle Spasm


Extremities:  Warmth, Redness greatly reduced. (bilateral ankles to mid shins/

mild. edema 1-2+ with stasis dermatitis. no weeping or open wounds. Symmetric 

edema. ) Tolerating Ace wraps. Skin is dry so will add lotion.


Skin: Skin is improved in coloration no longer pale. No: Wound, ecchymosis, 

petechiae or purpura.


Psychiatric: Normal affect, mood slightly anxious but much better than on 

admission





- Problem List & Annotations


(1) Complaint of debility and malaise


SNOMED Code(s): 734998220


   Code(s): R53.81 - OTHER MALAISE   Status: Acute   Current Visit: Yes   





(2) Acute on chronic renal failure


SNOMED Code(s): 304901110


   Code(s): N17.9 - ACUTE KIDNEY FAILURE, UNSPECIFIED; N18.9 - CHRONIC KIDNEY 

DISEASE, UNSPECIFIED   Status: Acute   Current Visit: Yes   





(3) Microcytic hypochromic anemia


SNOMED Code(s): 99244901


   Code(s): D50.9 - IRON DEFICIENCY ANEMIA, UNSPECIFIED   Status: Chronic   

Current Visit: Yes   





(4) Weakness of both lower extremities


SNOMED Code(s): 2274493


   Code(s): R29.898 - [facility] SYMPTOMS AND SIGNS INVOLVING THE MUSCULOSKELETAL 

SYSTEM   Status: Acute   Current Visit: Yes   





(5) CHF, Congestive heart failure


SNOMED Code(s): 91256886


   Code(s): I50.9 - HEART FAILURE, UNSPECIFIED   Status: Chronic   Current Visit

: Yes   Annotation/Comment:: Admit to In Pt   





(6) Palliative care status


SNOMED Code(s): 751987758


   Code(s): Z51.5 - ENCOUNTER FOR PALLIATIVE CARE   Status: Acute   Current 

Visit: Yes   





(7) Elevated brain natriuretic peptide (BNP) level


SNOMED Code(s): 107863762


   Code(s): R79.89 - OTHER SPECIFIED ABNORMAL FINDINGS OF BLOOD CHEMISTRY   

Status: Acute   Current Visit: Yes   





(8) Venous stasis dermatitis of both lower extremities


SNOMED Code(s): 99251433


   Code(s): I87.2 - VENOUS INSUFFICIENCY (CHRONIC) (PERIPHERAL)   Status: 

Chronic   Current Visit: Yes   





(9) Blood transfusion during current hospitalisation


SNOMED Code(s): 458534250


   Code(s): QRW7830 -    Status: Acute   Current Visit: Yes   Onset Date: 01/09/ 18   Annotation/Comment:: 1 unit given with minimal increase in temp, s/s of 

transfusion rxn. pt tolerated well.    





(10) Hypothyroidism


SNOMED Code(s): 45347384


   Code(s): E03.9 - HYPOTHYROIDISM, UNSPECIFIED   Status: Chronic   Current 

Visit: Yes   





(11) H/O deep venous thrombosis


SNOMED Code(s): 346375293


   Code(s): Z86.718 - PERSONAL HISTORY OF OTHER VENOUS THROMBOSIS AND EMBOLISM 

  Status: Chronic   Current Visit: Yes   





(12) Long term (current) use of anticoagulants


SNOMED Code(s): 628109288


   Code(s): Z79.01 - LONG TERM (CURRENT) USE OF ANTICOAGULANTS   Status: Acute 

  Current Visit: Yes   





(13) Diabetes type 2, controlled


SNOMED Code(s): 43726048


   Code(s): E11.9 - TYPE 2 DIABETES MELLITUS WITHOUT COMPLICATIONS   Status: 

Chronic   Current Visit: Yes   


Qualifiers: 


   Diabetes mellitus complication status: without complication   Diabetes 

mellitus long term insulin use: with long term use   Qualified Code(s): E11.9 - 

Type 2 diabetes mellitus without complications; Z79.4 - Long term (current) use 

of insulin; Z79.4 - Long term (current) use of insulin; Z79.4 - Long term (

current) use of insulin; Z79.4 - Long term (current) use of insulin   





(14) HTN (hypertension)


SNOMED Code(s): 65748751


   Code(s): I10 - ESSENTIAL (PRIMARY) HYPERTENSION   Status: Chronic   Current 

Visit: Yes   


Qualifiers: 


   Hypertension type: essential hypertension   Qualified Code(s): I10 - 

Essential (primary) hypertension   





- Problem List Review


Problem List Initiated/Reviewed/Updated: Yes





- My Orders


Last 24 Hours: 


My Active Orders





01/10/18 09:15


FERRITIN [REF] Routine 


FOLATE [REF] Routine 


IRON AND IRON BINDING CAPACITY [REF] Routine 





01/10/18 10:00


Furosemide [Lasix]   40 mg PO DAILY 


Lisinopril [Prinivil]   20 mg PO DAILY 


Nebivolol [Bystolic]   10 mg PO DAILY 





01/10/18 15:00


Acetaminophen/HYDROcodone [Norco 325-5 MG]   1 tab PO Q6H PRN 





01/10/18 Lunch


Consistent Carbohydrate Diet [DIET] 





01/11/18 08:00


Glimepiride [Amaryl]   1 mg PO WITHBREAKFAST 





01/12/18 05:11


BASIC METABOLIC PANEL,BMP [CHEM] AM 


CBC W/O DIFF,HEMOGRAM [HEME] AM 














- Assessment


Assessment:: 





please see above








- Plan


Plan:: 





Dose adjusting/discontinuing home meds due to renal function and consistent 

weakness with intermittent low bp has had no ill effects. Stopped metformin and 

starting glimiperide low dose this am. carb consistent diet added to low sodium 

and fluid restriction. continue accu checks. cut out amlodipine due to venous 

stasis. cut out hctz due to electrolytes and cut loop diuretic lasix in half as 

she is making good urine. her bystolic was also cut in half as well.  her b12 

is normal and folate is pending. monitoring INR closely with med changes. blood 

transfusion brought hgb up to > 9 mg/dL. Only 1 unit given. continue ACE wraps 

and elevation to legs. caution not to overload the heart. she clinically 

looking better each day and weight is down. will continue to monitor strict in/

outs. accurate weight measurements. chronic venous stasis dermatitis non 

infective improving. she will continue wraps until able to tolerate teds.  

disposition is fair to good. she is palliative care status. no longer safe in 

her own home, and will be going to [facility] tomorrow for continued palliative cares 

and medication management. will have pt/ot continue to work with her on 

ambulation and strengthening while overthere as well. Will d/c telemetry today. 

labs ordered for AM and is acceptible, anticipate discharge tomorrow.

## 2018-01-12 VITALS — DIASTOLIC BLOOD PRESSURE: 60 MMHG | SYSTOLIC BLOOD PRESSURE: 118 MMHG

## 2018-01-12 NOTE — [email or web address]
**Discharge Summary





- Hospital Course


Free Text/Narrative:: 





Date of admission: 1/9/2018


Date of discharge: 1/12/2018





Admission diagnosis: Diastolic congestive heart failure acute exacerbation with 

bilateral pedal edema. 


Discharge diagnosis: Same





Secondary diagnoses:


#1 diabetes mellitus type 2


#2 anemia chronic. Patient transfused 1 unit packed red blood cells during 

hospitalization with discharge hemoglobin of 10.4.


#3 chronic stasis dermatitis with significant edema on admission, significantly 

improved after a strapping during hospitalization.


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 

discharge 2.5.





Other diagnoses not addressed during hospitalization: Osteoarthritis, history 

of CVA, obesity, hypothyroidism, chronic kidney disease with stable creatinine 

of 1.4.





Consults: None


Procedures: Transfusion 1 unit packed red blood cells.





History of present illness:





The patient is an 83-year-old female who lives at home in assisted living who 

presented with weakness and the inability to get off the toilet. She did not 

fall. Additional loss of consciousness. She had no injury. She pressed her 

[facility] and was brought into the emergency department. She was found to have 

increased lower extremity edema. It was felt with hospitalization and 

assessment with the patient was no longer able to care for herself at home and 

so placement was pursued at a long-term care facility.





Hospital course:





Patient did very well throughout her hospitalization. After receiving 1 unit 

packed red blood cells, increased diuresis, and aggressive wrapping to the 

bilateral extremities, patient's strength improved, weakness improved. Her 

lower extremity edema improved significantly. Weight decreased from a high of 

87 kg to 85 kg at discharge.





Discharge instructions:


Patient will be discharged to [facility]. She can be followed 

by occupational and physical therapy. She should eat a low-salt, controlled 

carbohydrate diabetic diet. She was kept on a 1500 mL fluid restriction during 

hospitalization and will need to be monitored for signs of hypovolemia with 

continued diuretic therapy as this may be less than she what she is accustomed 

to at home. This patient would benefit both cardiac wise and diabetic wise from 

a planned weight loss diet with a goal of 1-2 pounds per month weight loss.


Follow up with Dr. Kim in 1 week. The day prior to this visit, she should 

have a CBC and a CMP done at the clinic for his review at the appointment. I 

did review the above changes with Dr. Kim by phone on the day of discharge.





- Discharge Data


Discharge Date: 01/12/18


Discharge Disposition: DC/Tfer to SNF 03


Condition: Fair





- Discharge Diagnosis/Problem(s)


(1) Diastolic congestive heart failure due to valvular disease


SNOMED Code(s): 072466382


   ICD Code: I38 - ENDOCARDITIS, VALVE UNSPECIFIED; I50.30 - UNSPECIFIED 

DIASTOLIC (CONGESTIVE) HEART FAILURE   Status: Acute   Current Visit: Yes   

Problem Details: Last echo in May 2017 showed preserved ejection fraction of 55%

, grade 1 diastolic dysfunction, moderate to severe aortic stenosis. Patient 

saw cardiology in November but these notes are not available in EMR. A number 

of medication changes were made throughout hospitalization. I spoke with the 

patient's primary care provider, Dr. Kim. We agreed to continue Bystolic at 

10 mg twice a day, lisinopril at half of previous dose now 20 mg daily, hold 

Norvasc 2.5 mg. Patient's hydrochlorothiazide was stopped and she has been 

continued on Lasix 40 mg daily. Given her diastolic dysfunction, spironolactone 

may be a reasonable addition as an outpatient but I question how much of her 

diastolic dysfunction is related to her aortic stenosis. Cardiology 

consultation recommendations will be helpful in making further recommendations.

   





(2) Long term (current) use of anticoagulants


SNOMED Code(s): 709811093


   ICD Code: Z79.01 - LONG TERM (CURRENT) USE OF ANTICOAGULANTS   Status: Acute

   Current Visit: Yes   Problem Details: Secondary to history of DVT with need 

for lifelong therapy. Although the patient had an increased INR of 6.1 in the 

recent past, INR has been well-controlled here and is 2.5 today. The patient 

may be a good candidate for Eliquis therapy if she continues to struggle with 

INR maintenance. We will defer this to the patient's primary care provider.   





(3) Diabetes type 2, controlled


SNOMED Code(s): 89801353


   ICD Code: E11.9 - TYPE 2 DIABETES MELLITUS WITHOUT COMPLICATIONS   Status: 

Chronic   Current Visit: Yes   Problem Details: Patient's last A1c was 7.2 on 

metformin 500 mg daily alone. This was stopped here and she was started on 

Amaryl. It's not entirely clear to me why. I'm going to resume her metformin on 

discharge. The patient did have a low blood sugar with symptoms the morning of 

discharge on the Amaryl. She may be a better candidate for Januvia if something 

additional needs to be added instead of a sulfonylurea.   


Qualifiers: 


   Diabetes mellitus complication status: without complication   Diabetes 

mellitus long term insulin use: with long term use   Qualified Code(s): E11.9 - 

Type 2 diabetes mellitus without complications; Z79.4 - Long term (current) use 

of insulin; Z79.4 - Long term (current) use of insulin; Z79.4 - Long term (

current) use of insulin; Z79.4 - Long term (current) use of insulin   





(4) H/O deep venous thrombosis


SNOMED Code(s): 198110692


   ICD Code: Z86.718 - PERSONAL HISTORY OF OTHER VENOUS THROMBOSIS AND EMBOLISM

   Status: Chronic   Current Visit: Yes   Problem Details: On warfarin. See 

above.   





(5) HTN (hypertension)


SNOMED Code(s): 29807899


   ICD Code: I10 - ESSENTIAL (PRIMARY) HYPERTENSION   Status: Chronic   Current 

Visit: Yes   Problem Details: Patient's documented blood pressures were 

controlled to 130s here in the hospital with her medications held/decreased. 

Good blood pressure control is a cornerstone of diastolic dysfunction therapy. 

Dr. Kim is aware of the medication changes and will follow up on these in 

the outpatient setting and resume medications as needed.   


Qualifiers: 


   Hypertension type: essential hypertension   Qualified Code(s): I10 - 

Essential (primary) hypertension   





(6) Hypothyroidism


SNOMED Code(s): 19205434


   ICD Code: E03.9 - HYPOTHYROIDISM, UNSPECIFIED   Status: Chronic   Current 

Visit: Yes   Problem Details: Continued on her home dose of 88 g Synthroid in 

the hospital.   





(7) Microcytic hypochromic anemia


SNOMED Code(s): 96049778


   ICD Code: D50.9 - IRON DEFICIENCY ANEMIA, UNSPECIFIED   Status: Chronic   

Current Visit: Yes   Problem Details: Patient's anemia was in the low 8 range 

on admission. She normally runs 8-9 in the outpatient setting. Due to her 

weakness and fatigue she was given 1 unit packed red blood cells. Hemoglobin on 

discharge was 10.4. MCV was above 80 on admission.   





(8) Venous stasis dermatitis of both lower extremities


SNOMED Code(s): 63080892


   ICD Code: I87.2 - VENOUS INSUFFICIENCY (CHRONIC) (PERIPHERAL)   Status: 

Chronic   Current Visit: Yes   Problem Details: Much improved with Ace wraps. 

Compression is key for treatment of this lower extremity edema to prevent 

stasis ulcers. Patient should continue Ace wraps when out of bed and/or 

compression hose and keep legs elevated when able.   





(9) CKD (chronic kidney disease), stage III


SNOMED Code(s): 285780058


   ICD Code: N18.3 - CHRONIC KIDNEY DISEASE, STAGE 3 (MODERATE)   Status: Acute

   Current Visit: Yes   Problem Details: Baseline creatinine is 1.4 range which 

is where she was here. Continue to monitor.   





(10) CAD (coronary artery disease)


SNOMED Code(s): 63784405


   ICD Code: I25.10 - ATHSCL HEART DISEASE OF NATIVE CORONARY ARTERY W/O ANG 

PCTRS   Status: Acute   Current Visit: Yes   Problem Details: Patient currently 

on statin. Complaining of myalgias. I'm going to hold the statin for now but 

did discuss with Dr. Kim and he will determine whether or not the patient 

would benefit long-term from continued statin therapy.   





- Patient Summary/Data


Consults: 


 Consultations





01/09/18 14:58


Consult to  [CONS] Routine 


   Comment: 


   Physician Instructions: 


   Reason for Consult: evaluate for nursing home placement.


OT Evaluation and Treatment [CONS] Routine 


   Please Evaluate and Treat.


   OT Reason for Consult: Discharge Planning


   This query below is only for informational purposes and is not editable.


   Admission Diagnosis/Problem: Congestive heart failure


PT Evaluation and Treatment [CONS] Routine 


   Please Evaluate and Treat.


   PT Reason for Consult: Strengthening


   This query below is only for informational purposes and is not editable.


   Admission Diagnosis/Problem: Congestive heart failure














- Patient Instructions


Diet: Heart Healthy Diet, Low Sodium, Fluid Restriction (1500 ml), Diabetic Diet


Fluid Restriction: 1500 mL





- Discharge Plan


Prescriptions/Med Rec: 


Lisinopril [Prinivil] 20 mg PO DAILY #30 tablet


Home Medications: 


 Home Meds





Multivitamin with Minerals [Multiple Vitamin] 2 tab PO DAILY 10/30/13 [History]


Nebivolol [Bystolic] 10 mg PO BID 10/30/13 [History]


Triamcinolone Acetonide [Kenalog 0.1% Crm] 15 gm .XX BID PRN 10/30/13 [History]


Vit A/Vit C/Vit E/Zinc/Copper [Preservision Areds Softgel] 1 each PO PCLUNCH 10/

30/13 [History]


Warfarin [Coumadin] 2.5 mg PO 1600 10/23/14 [History]


Ketotifen Fumarate [Zaditor] 1 drop EYEBOTH BID PRN 11/09/16 [History]


Calcium Carbonate [Calcium] 600 mg PO 10,18 11/10/16 [History]


Furosemide [Lasix] 40 mg PO DAILY #15 tablet 05/15/17 [Rx]


Levothyroxine [Synthroid] 88 mcg PO ACBREAKFAST 05/15/17 [History]


Ferrous Gluconate 324 mg PO DAILY 01/09/18 [History]


Lisinopril [Prinivil] 20 mg PO DAILY #30 tablet 01/12/18 [Rx]


metFORMIN HCl [Glucophage] 500 mg PO PCLUNCH #30 01/12/18 [Rx]








Patient Handouts:  Heart Failure, Easy-to-Read, Fall Prevention in Hospitals, 

Adult, Venous Thromboembolism Prevention, Glimepiride tablets


Forms:  ED Department Discharge


Referrals: 


Mabel Ruiz NP [Primary Care Provider] - 





- Discharge Summary/Plan Comment


DC Time >30 min.: Yes





- Patient Data


Vitals - Most Recent: 


 Last Vital Signs











Temp  36.9 C   01/12/18 08:50


 


Pulse  70   01/12/18 10:07


 


Resp  18   01/12/18 08:50


 


BP  118/60   01/12/18 10:07


 


Pulse Ox  98   01/12/18 08:50








 





Orthostatic Blood Pressure [     128/60


Standing]                        


Orthostatic Blood Pressure [     120/56


Sitting]                         


Orthostatic Blood Pressure [     110/58


Supine]                          








Weight - Most Recent: 85.729 kg


I&O - Last 24 hours: 


 Intake & Output











 01/11/18 01/12/18 01/12/18





 22:59 06:59 14:59


 


Intake Total 290 125 


 


Output Total  0 


 


Balance 290 125 











Lab Results - Last 24 hrs: 


 Laboratory Results - last 24 hr











  01/11/18 01/11/18 01/12/18 Range/Units





  17:04 20:30 05:30 


 


WBC     (4.5-12.0)  X10-3/uL


 


RBC     (3.23-5.20)  x10(6)uL


 


Hgb     (11.5-15.5)  g/dL


 


Hct     (30.0-51.3)  %


 


MCV     (80-96)  fL


 


MCH     (27.7-33.6)  pg


 


MCHC     (32.2-35.4)  g/dL


 


RDW     (11.5-15.5)  %


 


Plt Count     (125-369)  X10(3)uL


 


Sodium     (135-145)  mmol/L


 


Potassium     (3.5-5.3)  mmol/L


 


Chloride     (100-110)  mmol/L


 


Carbon Dioxide     (21-32)  mmol/L


 


BUN     (7-18)  mg/dL


 


Creatinine     (0.55-1.02)  mg/dL


 


Est Cr Clr Drug Dosing     mL/min


 


Estimated GFR (MDRD)     (>60)  


 


BUN/Creatinine Ratio     (9-20)  


 


Glucose     ()  mg/dL


 


POC Glucose  158 H  156 H  70 L D  ()  mg/dL


 


Calcium     (8.6-10.2)  mg/dL














  01/12/18 01/12/18 Range/Units





  06:20 06:20 


 


WBC  3.7 L   (4.5-12.0)  X10-3/uL


 


RBC  3.73   (3.23-5.20)  x10(6)uL


 


Hgb  10.4 L   (11.5-15.5)  g/dL


 


Hct  31.2   (30.0-51.3)  %


 


MCV  83.5   (80-96)  fL


 


MCH  27.8   (27.7-33.6)  pg


 


MCHC  33.3   (32.2-35.4)  g/dL


 


RDW  15.9 H   (11.5-15.5)  %


 


Plt Count  187   (125-369)  X10(3)uL


 


Sodium   137  (135-145)  mmol/L


 


Potassium   3.7  (3.5-5.3)  mmol/L


 


Chloride   99 L  (100-110)  mmol/L


 


Carbon Dioxide   28  (21-32)  mmol/L


 


BUN   27 H  (7-18)  mg/dL


 


Creatinine   1.4 H  (0.55-1.02)  mg/dL


 


Est Cr Clr Drug Dosing   26.29  mL/min


 


Estimated GFR (MDRD)   36 L  (>60)  


 


BUN/Creatinine Ratio   19.3  (9-20)  


 


Glucose   125 H D  ()  mg/dL


 


POC Glucose    ()  mg/dL


 


Calcium   8.8  (8.6-10.2)  mg/dL











Med Orders - Current: 


 Current Medications





Hydrocodone Bitart/Acetaminophen (Norco 325-5 Mg)  1 tab PO Q6H PRN


   PRN Reason: right should pain


Ferrous Gluconate (Ferrous Gluconate)  324 mg PO DAILY [facility]


   Last Admin: 01/12/18 10:07 Dose:  324 mg


Furosemide (Lasix)  20 mg IVPUSH ONETIME PRN


   PRN Reason: Edema


Furosemide (Lasix)  40 mg PO DAILY [facility]


   Last Admin: 01/12/18 10:07 Dose:  40 mg


Glimepiride (Amaryl)  1 mg PO WITHBREAKFAST [facility]


   Last Admin: 01/12/18 10:06 Dose:  Not Given


Sodium Chloride (Normal Saline)  250 mls @ 100 mls/hr IV ASDIRECTED [facility]


Insulin Aspart (Novolog)  0 unit SUBCUT QIDACANDBED [facility]


   PRN Reason: Protocol


   Last Admin: 01/12/18 08:47 Dose:  Not Given


Levothyroxine Sodium (Synthroid)  88 mcg PO 0600 [facility]


   Last Admin: 01/12/18 05:37 Dose:  88 mcg


Lisinopril (Prinivil)  20 mg PO DAILY [facility]


   Last Admin: 01/12/18 10:07 Dose:  20 mg


Lorazepam (Ativan)  0.5 mg PO Q8H PRN


   PRN Reason: Anxiety


Nebivolol (Bystolic)  10 mg PO DAILY [facility]


   Last Admin: 01/12/18 10:07 Dose:  10 mg


Warfarin Sodium (Coumadin)  2.5 mg PO 1600 [facility]


   Last Admin: 01/11/18 16:26 Dose:  2.5 mg


Zolpidem Tartrate (Ambien)  5 mg PO BEDTIME PRN


   PRN Reason: Sleep





Discontinued Medications





Furosemide (Lasix)  20 mg IVPUSH ONETIME ONE


   Stop: 01/09/18 16:32


   Last Admin: 01/09/18 16:57 Dose:  20 mg


Glimepiride (Glimepiride)  1 mg PO WITHBREAKFAST [facility]


   Last Admin: 01/10/18 12:44 Dose:  1 mg


Tuberculin PPD (Aplisol)  5 unit IDERM ONETIME ONE


   Stop: 01/12/18 08:49


   Last Admin: 01/12/18 10:00 Dose:  5 unit











*Q Meaningful Use (DIS)





- VTE *Q


VTE Criteria *Q: 








- Stroke *Q


Stroke Criteria *Q: 








- AMI *Q


AMI Criteria *Q:

## 2018-01-14 LAB — UIBC SERPL-MCNC: 270 UG/DL (ref 112–347)

## 2018-02-28 ENCOUNTER — HOSPITAL ENCOUNTER (EMERGENCY)
Dept: HOSPITAL 7 - FB.ED | Age: 83
Discharge: NURSING, REHAB OR LONG-TERM CARE | End: 2018-02-28
Payer: MEDICARE

## 2018-02-28 VITALS — SYSTOLIC BLOOD PRESSURE: 176 MMHG | DIASTOLIC BLOOD PRESSURE: 89 MMHG

## 2018-02-28 DIAGNOSIS — Z86.73: ICD-10-CM

## 2018-02-28 DIAGNOSIS — I35.0: ICD-10-CM

## 2018-02-28 DIAGNOSIS — E87.2: ICD-10-CM

## 2018-02-28 DIAGNOSIS — I50.9: ICD-10-CM

## 2018-02-28 DIAGNOSIS — E78.00: ICD-10-CM

## 2018-02-28 DIAGNOSIS — Z88.1: ICD-10-CM

## 2018-02-28 DIAGNOSIS — R79.82: ICD-10-CM

## 2018-02-28 DIAGNOSIS — Z79.01: ICD-10-CM

## 2018-02-28 DIAGNOSIS — Z79.899: ICD-10-CM

## 2018-02-28 DIAGNOSIS — Z87.891: ICD-10-CM

## 2018-02-28 DIAGNOSIS — Z90.49: ICD-10-CM

## 2018-02-28 DIAGNOSIS — J18.9: ICD-10-CM

## 2018-02-28 DIAGNOSIS — E03.9: ICD-10-CM

## 2018-02-28 DIAGNOSIS — E11.9: ICD-10-CM

## 2018-02-28 DIAGNOSIS — Z79.84: ICD-10-CM

## 2018-02-28 DIAGNOSIS — I11.0: Primary | ICD-10-CM

## 2018-02-28 PROCEDURE — 71045 X-RAY EXAM CHEST 1 VIEW: CPT

## 2018-02-28 PROCEDURE — 93005 ELECTROCARDIOGRAM TRACING: CPT

## 2018-02-28 PROCEDURE — 94640 AIRWAY INHALATION TREATMENT: CPT

## 2018-02-28 PROCEDURE — 96376 TX/PRO/DX INJ SAME DRUG ADON: CPT

## 2018-02-28 PROCEDURE — 84484 ASSAY OF TROPONIN QUANT: CPT

## 2018-02-28 PROCEDURE — 99285 EMERGENCY DEPT VISIT HI MDM: CPT

## 2018-02-28 PROCEDURE — 82803 BLOOD GASES ANY COMBINATION: CPT

## 2018-02-28 PROCEDURE — 96374 THER/PROPH/DIAG INJ IV PUSH: CPT

## 2018-02-28 PROCEDURE — 87804 INFLUENZA ASSAY W/OPTIC: CPT

## 2018-02-28 PROCEDURE — 80053 COMPREHEN METABOLIC PANEL: CPT

## 2018-02-28 PROCEDURE — 81001 URINALYSIS AUTO W/SCOPE: CPT

## 2018-02-28 PROCEDURE — 36415 COLL VENOUS BLD VENIPUNCTURE: CPT

## 2018-02-28 PROCEDURE — 85025 COMPLETE CBC W/AUTO DIFF WBC: CPT

## 2018-02-28 PROCEDURE — 83880 ASSAY OF NATRIURETIC PEPTIDE: CPT

## 2018-02-28 PROCEDURE — 86140 C-REACTIVE PROTEIN: CPT

## 2018-02-28 PROCEDURE — 85610 PROTHROMBIN TIME: CPT

## 2018-02-28 NOTE — EDM.PDOC
ED HPI GENERAL MEDICAL PROBLEM





- General


Chief Complaint: Respiratory Problem


Stated Complaint: SOB


Time Seen by Provider: 18 20:20


Source of Information: Reports: Patient


History Limitations: Reports: No Limitations





- History of Present Illness


INITIAL COMMENTS - FREE TEXT/NARRATIVE: 





c/o sob x 4d





no fever, saw PCP Dr Kim yesterday and begun on antbx





labs obtained yesterday by Dr Kim, will check with [facility] for test 

results





last labs here with wbc 3.s, hgb 9.1 and plts 173 62 ago. INR 2.49 6w ago, BUN/

creat 26/1.0 62 ago, A1C 7.2 62 ago, AST/ALT /18 62 ago, CRP 2.3 9m, u/a mod 

bact 62 ago





denies pain, no HA, no CP, no abd pain





h/o dHF, CAD








  ** Upper Chest


Pain Score (Numeric/FACES): 4





- Related Data


 Allergies











Allergy/AdvReac Type Severity Reaction Status Date / Time


 


ciprofloxacin [From Cipro] Allergy  Rash Verified 18 20:31


 


ciprofloxacin HCl Allergy  Rash Verified 18 20:31





[From Cipro]     











Home Meds: 


 Home Meds





Multivitamin with Minerals [Multiple Vitamin] 2 tab PO DAILY 10/30/13 [History]


Nebivolol [Bystolic] 10 mg PO BID 10/30/13 [History]


Triamcinolone Acetonide [Kenalog 0.1% Crm] 15 gm .XX BID PRN 10/30/13 [History]


Vit A/Vit C/Vit E/Zinc/Copper [Preservision Areds Softgel] 1 each PO PCLUNCH 10/

30/13 [History]


Warfarin [Coumadin] 2.5 mg PO 1600 10/23/14 [History]


Ketotifen Fumarate [Zaditor] 1 drop EYEBOTH BID PRN 16 [History]


Calcium Carbonate [Calcium] 600 mg PO 10,18 11/10/16 [History]


Furosemide [Lasix] 40 mg PO DAILY #15 tablet 05/15/17 [Rx]


Levothyroxine [Synthroid] 88 mcg PO ACBREAKFAST 05/15/17 [History]


Ferrous Gluconate 324 mg PO DAILY 18 [History]


Lisinopril [Prinivil] 20 mg PO DAILY #30 tablet 18 [Rx]


metFORMIN HCl [Glucophage] 500 mg PO PCLUNCH #30 18 [Rx]











Past Medical History


HEENT History: Reports: Cataract, Impaired Vision


Cardiovascular History: Reports: Blood Clots/VTE/DVT, Heart Failure, Heart 

Murmur, High Cholesterol, Hypertension


Respiratory History: Reports: None


Gastrointestinal History: Reports: Diverticulosis


Genitourinary History: Reports: Urinary Incontinence, Other (See Below)


Other Genitourinary History: A&P REPAIR,  DYSFUNCTIONAL UTERINE BLEEDING.


OB/GYN History: Reports: Dysfunctional Uterine Bleeding, Pregnancy


Other OB/BYN History:  II PARA II


Musculoskeletal History: Reports: Osteoarthritis


Neurological History: Reports: CVA


Psychiatric History: Reports: None


Endocrine/Metabolic History: Reports: Diabetes, Type II, Hypothyroidism, Obesity

/BMI 30+


Hematologic History: Reports: Anemia


Other Hematologic History: POST SURGICAL FROM DUB


Immunologic History: Reports: None


Oncologic (Cancer) History: Reports: None


Dermatologic History: Reports: Venous Stasis Dermatitis





- Infectious Disease History


Infectious Disease History: Reports: Chicken Pox, Measles, Mumps





- Past Surgical History


Head Surgeries/Procedures: Reports: None


HEENT Surgical History: Reports: Cataract Surgery


GI Surgical History: Reports: Appendectomy, Cholecystectomy, Colon, Colonoscopy

, Hernia, Abdominal, Hernia Repair/Other


Female  Surgical History: Reports: D&C, Hysterectomy, Oophorectomy, Salpingo-

Oophorectomy





Social & Family History





- Family History


Other HEENT Family History: SEE HX





- Tobacco Use


Smoking Status *Q: Never Smoker


Years of Tobacco use: 5


Packs/Tins Daily: 0.2


Used Tobacco, but Quit: Yes


Month Tobacco Last Used: 1970


Second Hand Smoke Exposure: No





- Caffeine Use


Caffeine Use: Reports: Coffee


Other Caffeine Use: 2 cups a day





- Alcohol Use


Days Per Week of Alcohol Use: 0





- Recreational Drug Use


Recreational Drug Use: No





ED ROS GENERAL





- Review of Systems


Review Of Systems: See Below


Constitutional: Reports: No Symptoms


HEENT: Reports: No Symptoms


Respiratory: Reports: Shortness of Breath, Wheezing


Cardiovascular: Reports: Edema.  Denies: Chest Pain


Endocrine: Reports: No Symptoms


GI/Abdominal: Reports: No Symptoms


: Reports: No Symptoms


Musculoskeletal: Reports: No Symptoms


Skin: Reports: No Symptoms


Neurological: Reports: No Symptoms


Psychiatric: Reports: No Symptoms


Hematologic/Lymphatic: Reports: No Symptoms


Immunologic: Reports: No Symptoms





ED EXAM, GENERAL





- Physical Exam


Exam: See Below


Exam Limited By: No Limitations


General Appearance: Alert, WD/WN, Moderate Distress, Other (alert, conversant, 

speaks in 6 word sentences, nontoxic)


Nose: Normal Inspection, Normal Mucosa, No Blood


Throat/Mouth: Normal Inspection, Normal Lips, Normal Teeth


Cardiovascular: No Rub, Tachycardia, Other (2/6 NATHANIEL at LSB)


Back Exam: Normal Inspection


Extremities: Normal Inspection, Other (3+ tense edema to groin b/l)


Neurological: Alert, CN II-XII Intact, No Motor/Sensory Deficits


Skin Exam: Warm, Dry, Intact, Normal Color, No Rash


Lymphatic: No Adenopathy





Course





- Vital Signs


Last Recorded V/S: 


 Last Vital Signs











Temp  37.2 C   18 21:14


 


Pulse  86   18 21:14


 


Resp  28 H  18 21:14


 


BP  176/89 H  18 21:14


 


Pulse Ox  2 L  18 21:14














- Orders/Labs/Meds


Orders: 


 Active Orders 24 hr











 Category Date Time Status


 


 EKG Documentation Completion [RC] ASDIRECTED Care  18 20:40 Ordered


 


 RT Aerosol Therapy [RC] ASDIRECTED Care  18 20:34 Active


 


 Chest 1V Frontal [CR] Stat Exams  18 20:34 Ordered


 


 UA W/MICROSCOPIC [URIN] Stat Lab  18 20:37 Ordered


 


 Sodium Chloride 0.9% [Saline Flush] Med  18 20:32 Active





 10 ml FLUSH ASDIRECTED PRN   


 


 Saline Lock Insert [OM.PC] Routine Oth  18 20:32 Ordered


 


 EKG 12 Lead [EK] Routine Ther  18 20:37 Ordered








 Medication Orders





Sodium Chloride (Saline Flush)  10 ml FLUSH ASDIRECTED PRN


   PRN Reason: Keep Vein Open


   Last Admin: 18 20:48  Dose: 10 ml








Labs: 


 Laboratory Tests











  18 Range/Units





  20:20 20:20 20:20 


 


WBC  8.9    (4.5-12.0)  X10-3/uL


 


RBC  3.76    (3.23-5.20)  x10(6)uL


 


Hgb  9.7 L    (11.5-15.5)  g/dL


 


Hct  30.4    (30.0-51.3)  %


 


MCV  80.7    (80-96)  fL


 


MCH  25.9 L    (27.7-33.6)  pg


 


MCHC  32.1 L    (32.2-35.4)  g/dL


 


RDW  16.3 H    (11.5-15.5)  %


 


Plt Count  170    (125-369)  X10(3)uL


 


MPV  9.6    (7.4-10.4)  fL


 


Add Manual Diff  Yes    


 


Neutrophils % (Manual)  75    (46-82)  %


 


Lymphocytes % (Manual)  17    (13-37)  %


 


Monocytes % (Manual)  8    (4-12)  %


 


Anisocytosis  Few    


 


PT   20.3 H   (8.7-11.1)  


 


INR   1.98 H   (0.89-1.13)  


 


POC VBG pH     (7.31-7.41)  


 


POC VBG pCO2     (41-51)  mmHG


 


POC VBG HCO3     (23-28)  mmol/L


 


POC VBG Total CO2     (24-29)  mmol/L


 


POC VBG Base Excess     (-2-3)  mmol/L


 


Sodium    129 L D  (135-145)  mmol/L


 


Potassium    4.2  (3.5-5.3)  mmol/L


 


Chloride    93 L D  (100-110)  mmol/L


 


Carbon Dioxide    27  (21-32)  mmol/L


 


BUN    23 H  (7-18)  mg/dL


 


Creatinine    1.3 H  (0.55-1.02)  mg/dL


 


Est Cr Clr Drug Dosing    TNP  


 


Estimated GFR (MDRD)    39 L  (>60)  


 


BUN/Creatinine Ratio    17.7  (9-20)  


 


Glucose    182 H  ()  mg/dL


 


Calcium    8.9  (8.6-10.2)  mg/dL


 


Total Bilirubin    0.7  (0.1-1.3)  mg/dL


 


AST    38 H D  (5-25)  IU/L


 


ALT    20  D  (12-36)  U/L


 


Alkaline Phosphatase    86  ()  IU/L


 


Troponin I     (<0.017-0.056)  ng/mL


 


C-Reactive Protein     (0.5-0.9)  mg/dL


 


NT-Pro-B Natriuret Pep     (<=450)  pg/mL


 


Total Protein    8.6 H  (6.0-8.0)  g/dL


 


Albumin    2.7 L  (3.2-4.6)  g/dL


 


Globulin    5.9  g/dL


 


Albumin/Globulin Ratio    0.5  














  18 Range/Units





  20:20 20:20 


 


WBC    (4.5-12.0)  X10-3/uL


 


RBC    (3.23-5.20)  x10(6)uL


 


Hgb    (11.5-15.5)  g/dL


 


Hct    (30.0-51.3)  %


 


MCV    (80-96)  fL


 


MCH    (27.7-33.6)  pg


 


MCHC    (32.2-35.4)  g/dL


 


RDW    (11.5-15.5)  %


 


Plt Count    (125-369)  X10(3)uL


 


MPV    (7.4-10.4)  fL


 


Add Manual Diff    


 


Neutrophils % (Manual)    (46-82)  %


 


Lymphocytes % (Manual)    (13-37)  %


 


Monocytes % (Manual)    (4-12)  %


 


Anisocytosis    


 


PT    (8.7-11.1)  


 


INR    (0.89-1.13)  


 


POC VBG pH   7.28 L  (7.31-7.41)  


 


POC VBG pCO2   53.4 H  (41-51)  mmHG


 


POC VBG HCO3   25.1  (23-28)  mmol/L


 


POC VBG Total CO2   27  (24-29)  mmol/L


 


POC VBG Base Excess   -2  (-2-3)  mmol/L


 


Sodium    (135-145)  mmol/L


 


Potassium    (3.5-5.3)  mmol/L


 


Chloride    (100-110)  mmol/L


 


Carbon Dioxide    (21-32)  mmol/L


 


BUN    (7-18)  mg/dL


 


Creatinine    (0.55-1.02)  mg/dL


 


Est Cr Clr Drug Dosing    


 


Estimated GFR (MDRD)    (>60)  


 


BUN/Creatinine Ratio    (9-20)  


 


Glucose    ()  mg/dL


 


Calcium    (8.6-10.2)  mg/dL


 


Total Bilirubin    (0.1-1.3)  mg/dL


 


AST    (5-25)  IU/L


 


ALT    (12-36)  U/L


 


Alkaline Phosphatase    ()  IU/L


 


Troponin I  0.022   (<0.017-0.056)  ng/mL


 


C-Reactive Protein  16.5 H*   (0.5-0.9)  mg/dL


 


NT-Pro-B Natriuret Pep  51502 H*   (<=450)  pg/mL


 


Total Protein    (6.0-8.0)  g/dL


 


Albumin    (3.2-4.6)  g/dL


 


Globulin    g/dL


 


Albumin/Globulin Ratio    











Meds: 


Medications











Generic Name Dose Route Start Last Admin





  Trade Name Reena  PRN Reason Stop Dose Admin


 


Sodium Chloride  10 ml  18 20:32  18 20:48





  Saline Flush  FLUSH   10 ml





  ASDIRECTED PRN   Administration





  Keep Vein Open   














Discontinued Medications














Generic Name Dose Route Start Last Admin





  Trade Name Reena  PRN Reason Stop Dose Admin


 


Albuterol/Ipratropium  3 ml  18 20:33  18 20:57





  Duoneb 3.0-0.5 Mg/3 Ml  NEB  18 20:34  3 ml





  ONETIME ONE   Administration


 


Furosemide  40 mg  18 20:31  18 20:43





  Lasix  IVPUSH  18 20:32  40 mg





  NOW ONE   Administration


 


Furosemide  Confirm  18 20:39  18 20:48





  Lasix  Administered  18 20:40  Not Given





  Dose   





  40 mg   





  .ROUTE   





  .STK-MED ONE   


 


Furosemide  40 mg  18 21:32  18 21:51





  Lasix  IVPUSH  18 21:33  40 mg





  NOW ONE   Administration














- Re-Assessments/Exams


Free Text/Narrative Re-Assessment/Exam: 





18 21:54


less dyspnea and wheeze after Duoneb x 1 and furosemide 40 mg IV, large void in 

diaper, has to void again





will give a 2nd dose of furosemide





begun on cefuroxime 250 mg BID x 5 doses so far, WBC not inc'd altho CRP 16.5 c/

w secondary pneumonia





CxR with small b/l effusions and perihilar infiltrates





echo 3w ago with EF 40-45%, severe AS with mean gradient 52 mm HG and area 0.81 

cm2





today with pH 7.28 and pCO2 53





pt had planned to have valvuloplasty and was scheduled to meet with 

cardiologist tomorrow





Departure





- Departure


Time of Disposition: 22:05


Disposition: DC/[facility]fer to [facility]


Condition: Good


Clinical Impression: 


 Acute exacerbation of congestive heart failure, Severe aortic stenosis, 

Respiratory acidosis, Carbon dioxide retention, Elevated C-reactive protein (CRP

), Pneumonia








- Discharge Information


Referrals: 


Strand,Duane, MD [Primary Care Provider] - 


Forms:  ED Department Discharge





- My Orders


Last 24 Hours: 


My Active Orders





18 20:32


Sodium Chloride 0.9% [Saline Flush]   10 ml FLUSH ASDIRECTED PRN 


Saline Lock Insert [OM.PC] Routine 





18 20:34


RT Aerosol Therapy [RC] ASDIRECTED 


Chest 1V Frontal [CR] Stat 





18 20:37


UA W/MICROSCOPIC [URIN] Stat 


EKG 12 Lead [EK] Routine 





18 20:40


EKG Documentation Completion [RC] ASDIRECTED 














- Assessment/Plan


Last 24 Hours: 


My Active Orders





18 20:32


Sodium Chloride 0.9% [Saline Flush]   10 ml FLUSH ASDIRECTED PRN 


Saline Lock Insert [OM.PC] Routine 





18 20:34


RT Aerosol Therapy [RC] ASDIRECTED 


Chest 1V Frontal [CR] Stat 





18 20:37


UA W/MICROSCOPIC [URIN] Stat 


EKG 12 Lead [EK] Routine 





18 20:40


EKG Documentation Completion [RC] ASDIRECTED

## 2018-03-02 NOTE — CR
INDICATION:  Short of breath.



CHEST:  An AP portable upright view of the chest, 02/28/2018, was compared with 
01/09/2018 and 05/14/2017, again revealing the heart to be enlarged.  The aorta 
is tortuous with calcification in the arch.  Overlying EKG leads are noted.



Pulmonary vasculature is markedly prominent, compatible with CHF.  Infiltrates 
are suggested, both interstitial and some alveolar, compatible with acute 
pulmonary edema.



The possibility of superimposed aspiration pneumonia and other pneumonia would 
also be a consideration. 



At the right lung base, there are two somewhat nodular areas, which could 
represent abscess formation or possibly focal areas of pneumonia or edema.  



Followup to clearing is recommended.

MTDD

## 2018-06-15 ENCOUNTER — HOSPITAL ENCOUNTER (EMERGENCY)
Dept: HOSPITAL 7 - FB.ED | Age: 83
Discharge: NURSING, REHAB OR LONG-TERM CARE | End: 2018-06-15
Payer: MEDICARE

## 2018-06-15 VITALS — SYSTOLIC BLOOD PRESSURE: 165 MMHG | DIASTOLIC BLOOD PRESSURE: 66 MMHG

## 2018-06-15 DIAGNOSIS — K29.70: Primary | ICD-10-CM

## 2018-06-15 DIAGNOSIS — K21.9: ICD-10-CM

## 2018-06-15 DIAGNOSIS — Z79.899: ICD-10-CM

## 2018-06-15 DIAGNOSIS — E11.9: ICD-10-CM

## 2018-06-15 DIAGNOSIS — E03.9: ICD-10-CM

## 2018-06-15 DIAGNOSIS — I11.0: ICD-10-CM

## 2018-06-15 DIAGNOSIS — E78.00: ICD-10-CM

## 2018-06-15 DIAGNOSIS — J45.909: ICD-10-CM

## 2018-06-15 DIAGNOSIS — Z79.84: ICD-10-CM

## 2018-06-15 DIAGNOSIS — I50.9: ICD-10-CM

## 2018-06-15 DIAGNOSIS — Z88.1: ICD-10-CM

## 2018-06-15 PROCEDURE — 96374 THER/PROPH/DIAG INJ IV PUSH: CPT

## 2018-06-15 PROCEDURE — 85610 PROTHROMBIN TIME: CPT

## 2018-06-15 PROCEDURE — 36415 COLL VENOUS BLD VENIPUNCTURE: CPT

## 2018-06-15 PROCEDURE — 80053 COMPREHEN METABOLIC PANEL: CPT

## 2018-06-15 PROCEDURE — 99284 EMERGENCY DEPT VISIT MOD MDM: CPT

## 2018-06-15 PROCEDURE — 96361 HYDRATE IV INFUSION ADD-ON: CPT

## 2018-06-15 PROCEDURE — 84484 ASSAY OF TROPONIN QUANT: CPT

## 2018-06-15 PROCEDURE — 93005 ELECTROCARDIOGRAM TRACING: CPT

## 2018-06-15 PROCEDURE — 85025 COMPLETE CBC W/AUTO DIFF WBC: CPT

## 2018-06-15 PROCEDURE — C9113 INJ PANTOPRAZOLE SODIUM, VIA: HCPCS

## 2018-06-15 PROCEDURE — 81001 URINALYSIS AUTO W/SCOPE: CPT

## 2018-06-15 PROCEDURE — 96375 TX/PRO/DX INJ NEW DRUG ADDON: CPT

## 2018-06-15 NOTE — EDM.PDOC
ED HPI GENERAL MEDICAL PROBLEM





- General


Chief Complaint: Gastrointestinal Problem


Stated Complaint: VOMITING


Time Seen by Provider: 06/15/18 14:45


Source of Information: Reports: Patient, Family, Old Records


History Limitations: Reports: No Limitations





- History of Present Illness


INITIAL COMMENTS - FREE TEXT/NARRATIVE: 





Trixie is a patient at [facility] who presents with a 24 hr hx of nausea, 

emesis of partially digested food and beverages, and malaise. There is no known 

exposure, no christie abdominal pain, burning, gas or belching, and no diarrhea. 

There is no fever, chills or sweats, There is no chest pain or SOB, dizziness, 

lt headiness, or back ache. She is post op intravascular aortic valve 

replacement at [facility]. 





- Related Data


 Allergies











Allergy/AdvReac Type Severity Reaction Status Date / Time


 


ciprofloxacin [From Cipro] Allergy  Rash Verified 18 20:31


 


ciprofloxacin HCl Allergy  Rash Verified 18 20:31





[From Cipro]     











Home Meds: 


 Home Meds





Multivitamin with Minerals [Multiple Vitamin] 2 tab PO DAILY 10/30/13 [History]


Nebivolol [Bystolic] 10 mg PO BID 10/30/13 [History]


Triamcinolone Acetonide [Kenalog 0.1% Crm] 15 gm .XX BID PRN 10/30/13 [History]


Vit A/Vit C/Vit E/Zinc/Copper [Preservision Areds Softgel] 1 each PO PCLUNCH 10/

30/13 [History]


Warfarin [Coumadin] 2.5 mg PO 1600 10/23/14 [History]


Ketotifen Fumarate [Zaditor] 1 drop EYEBOTH BID PRN 16 [History]


Calcium Carbonate [Calcium] 600 mg PO 10,18 11/10/16 [History]


Furosemide [Lasix] 40 mg PO DAILY #15 tablet 05/15/17 [Rx]


Levothyroxine [Synthroid] 88 mcg PO ACBREAKFAST 05/15/17 [History]


Ferrous Gluconate 324 mg PO DAILY 18 [History]


Lisinopril [Prinivil] 20 mg PO DAILY #30 tablet 18 [Rx]


metFORMIN HCl [Glucophage] 500 mg PO PCLUNCH #30 18 [Rx]











Past Medical History


HEENT History: Reports: Cataract, Glaucoma, Impaired Vision


Cardiovascular History: Reports: Blood Clots/VTE/DVT, Heart Failure, Heart 

Murmur, Heart Valve Replacement, High Cholesterol, Hypertension, Other (See 

Below) (POST OP INTRAVASCULAR AORTIC VALVE REPLACEMENT)


Respiratory History: Reports: Asthma


Gastrointestinal History: Reports: Diverticulosis, GERD


Genitourinary History: Reports: Urinary Incontinence, Other (See Below)


Other Genitourinary History: A&P REPAIR,  DYSFUNCTIONAL UTERINE BLEEDING.


OB/GYN History: Reports: Dysfunctional Uterine Bleeding, Pregnancy


Other OB/BYN History:  II PARA II


Musculoskeletal History: Reports: Osteoarthritis


Neurological History: Reports: CVA


Psychiatric History: Reports: None


Endocrine/Metabolic History: Reports: Diabetes, Type II, Hypothyroidism, Obesity

/BMI 30+


Hematologic History: Reports: Anemia


Other Hematologic History: POST SURGICAL FROM DUB


Immunologic History: Reports: None


Oncologic (Cancer) History: Reports: None


Dermatologic History: Reports: Venous Stasis Dermatitis





- Infectious Disease History


Infectious Disease History: Reports: Chicken Pox, Measles, Mumps





- Past Surgical History


Head Surgeries/Procedures: Reports: None


HEENT Surgical History: Reports: Cataract Surgery


GI Surgical History: Reports: Appendectomy, Cholecystectomy, Colon, Colonoscopy

, Hernia, Abdominal, Hernia Repair/Other


Female  Surgical History: Reports: D&C, Hysterectomy, Oophorectomy, Salpingo-

Oophorectomy





Social & Family History





- Family History


Other HEENT Family History: SEE HX





- Tobacco Use


Smoking Status *Q: Never Smoker


Second Hand Smoke Exposure: No





- Caffeine Use


Caffeine Use: Reports: Coffee


Other Caffeine Use: 2 cups a day


Caffeine Use Comment: 1 cup coffee per day.





- Recreational Drug Use


Recreational Drug Use: No





ED ROS GENERAL





- Review of Systems


Review Of Systems: See Below


Constitutional: Reports: Malaise, Decreased Appetite


HEENT: Reports: No Symptoms


Respiratory: Reports: No Symptoms


Cardiovascular: Reports: Blood Pressure Problem


Endocrine: Reports: No Symptoms


GI/Abdominal: Reports: Decreased Appetite, Nausea, Vomiting


: Reports: No Symptoms


Musculoskeletal: Reports: No Symptoms


Skin: Reports: No Symptoms


Neurological: Reports: No Symptoms


Psychiatric: Reports: No Symptoms


Hematologic/Lymphatic: Reports: No Symptoms


Immunologic: Reports: No Symptoms





ED EXAM, GI/ABD





- Physical Exam


Exam: See Below


Exam Limited By: No Limitations


General Appearance: Alert, WD/WN, No Apparent Distress


Eyes: Bilateral: Normal Appearance, EOMI


Ears: Normal External Exam


Nose: Normal Inspection


Throat/Mouth: Normal Lips, Normal Gums, Other (xerostomia)


Head: Normocephalic


Neck: Normal Inspection, Supple, Non-Tender


Respiratory/Chest: Lungs Clear, Normal Breath Sounds, Chest Non-Tender


Cardiovascular: Regular Rate, Rhythm, No Edema, No Murmur


GI/Abdominal Exam: Normal Bowel Sounds, Soft, Non-Tender, No Organomegaly, No 

Distention, No Mass


 (Female) Exam: Deferred


Rectal (Female) Exam: Deferred


Back Exam: Normal Inspection


Extremities: Normal Inspection, Normal Range of Motion


Neurological: Alert, Oriented, CN II-XII Intact, Normal Cognition, No Motor/

Sensory Deficits


Psychiatric: Normal Affect, Normal Mood


Skin Exam: Warm, Dry, Intact


Lymphatic: No Adenopathy





Course





- Vital Signs


Text/Narrative:: 





Following assessment at the [facility] ED, an IV was started in the RUE, and Trixie 

was administered 1L of NS over 2 hours, Protonix 40 mg IV, Zofran 4 mg IV, with 

remission of sxs. Screening labs including ekg, CBC, CMP, Troponin I and UA 

were all baseline. A gastritis was suspected. 


Last Recorded V/S: 


 Last Vital Signs











Temp  36.3 C   06/15/18 14:05


 


Pulse  99   06/15/18 14:05


 


Resp  20   06/15/18 14:05


 


BP  179/82 H  06/15/18 14:05


 


Pulse Ox  96   06/15/18 14:05














- Orders/Labs/Meds


Orders: 


 Active Orders 24 hr











 Category Date Time Status


 


 EKG Documentation Completion [RC] ASDIRECTED Care  06/15/18 14:58 Active


 


 UA W/MICROSCOPIC [URIN] Stat Lab  06/15/18 16:15 Ordered


 


 Sodium Chloride 0.9% [Normal Saline] 1,000 ml Med  06/15/18 15:00 Active





 IV ASDIRECTED   


 


 Sodium Chloride 0.9% [Saline Flush] Med  06/15/18 14:57 Active





 10 ml FLUSH ASDIRECTED PRN   


 


 Peripheral IV Insertion Adult [OM.PC] Routine Oth  06/15/18 14:57 Ordered


 


 EKG 12 Lead [EK] Routine Ther  06/15/18 14:57 Ordered








 Medication Orders





Sodium Chloride (Normal Saline)  1,000 mls @ 500 mls/hr IV ASDIRECTED SILVIO


   Last Admin: 06/15/18 15:18  Dose: 500 mls/hr


Sodium Chloride (Saline Flush)  10 ml FLUSH ASDIRECTED PRN


   PRN Reason: Keep Vein Open


   Last Admin: 06/15/18 15:10  Dose: 10 ml








Labs: 


 Laboratory Tests











  06/15/18 06/15/18 06/15/18 Range/Units





  15:11 15:11 15:11 


 


WBC  6.0    (4.5-12.0)  X10-3/uL


 


RBC  3.25    (3.23-5.20)  x10(6)uL


 


Hgb  9.9 L    (11.5-15.5)  g/dL


 


Hct  29.4 L    (30.0-51.3)  %


 


MCV  90.5    (80-96)  fL


 


MCH  30.4    (27.7-33.6)  pg


 


MCHC  33.6    (32.2-35.4)  g/dL


 


RDW  15.3    (11.5-15.5)  %


 


Plt Count  240    (125-369)  X10(3)uL


 


MPV  7.8    (7.4-10.4)  fL


 


Neut % (Auto)  86.1 H    (46-82)  %


 


Lymph % (Auto)  7.0 L    (13-37)  %


 


Mono % (Auto)  5.3    (4-12)  %


 


Eos % (Auto)  0 L    (1.0-5.0)  %


 


Baso % (Auto)  2    (0-2)  %


 


Neut # (Auto)  5.1    (1.6-8.3)  #


 


Lymph # (Auto)  0.4 L    (0.6-5.0)  #


 


Mono # (Auto)  0.3    (0.0-1.3)  #


 


Eos # (Auto)  0.0    (0.0-0.8)  #


 


Baso # (Auto)  0.1    (0.0-0.2)  #


 


PT     (8.7-11.1)  


 


INR     (0.89-1.13)  


 


Sodium   134 L   (135-145)  mmol/L


 


Potassium   3.7   (3.5-5.3)  mmol/L


 


Chloride   96 L   (100-110)  mmol/L


 


Carbon Dioxide   26   (21-32)  mmol/L


 


BUN   37 H D   (7-18)  mg/dL


 


Creatinine   1.4 H   (0.55-1.02)  mg/dL


 


Est Cr Clr Drug Dosing   TNP   


 


Estimated GFR (MDRD)   36 L   (>60)  


 


BUN/Creatinine Ratio   26.4 H   (9-20)  


 


Glucose   229 H   ()  mg/dL


 


Calcium   9.9   (8.6-10.2)  mg/dL


 


Total Bilirubin   0.8   (0.1-1.3)  mg/dL


 


AST   37 H   (5-25)  IU/L


 


ALT   26  D   (12-36)  U/L


 


Alkaline Phosphatase   85   ()  IU/L


 


Troponin I    0.017  (<0.017-0.056)  ng/mL


 


Total Protein   9.9 H   (6.0-8.0)  g/dL


 


Albumin   4.0   (3.2-4.6)  g/dL


 


Globulin   5.9   g/dL


 


Albumin/Globulin Ratio   0.7   


 


Urine Color     (YELLOW)  


 


Urine Appearance     (CLEAR)  


 


Urine pH     (5.0-6.5)  


 


Ur Specific Gravity     (1.010-1.025)  


 


Urine Protein     (NEGATIVE)  mg/dL


 


Urine Glucose (UA)     (NEGATIVE)  mg/dL


 


Urine Ketones     (NEGATIVE)  mg/dL


 


Urine Occult Blood     (NEGATIVE)  


 


Urine Nitrite     (NEGATIVE)  


 


Urine Bilirubin     (NEGATIVE)  


 


Urine Urobilinogen     (NEGATIVE)  mg/dL


 


Ur Leukocyte Esterase     (NEGATIVE)  


 


Urine RBC     (0)  


 


Urine WBC     (0)  


 


Ur Squamous Epith Cells     (NS,R,O)  


 


Urine Bacteria     (NS)  














  06/15/18 06/15/18 Range/Units





  15:11 16:15 


 


WBC    (4.5-12.0)  X10-3/uL


 


RBC    (3.23-5.20)  x10(6)uL


 


Hgb    (11.5-15.5)  g/dL


 


Hct    (30.0-51.3)  %


 


MCV    (80-96)  fL


 


MCH    (27.7-33.6)  pg


 


MCHC    (32.2-35.4)  g/dL


 


RDW    (11.5-15.5)  %


 


Plt Count    (125-369)  X10(3)uL


 


MPV    (7.4-10.4)  fL


 


Neut % (Auto)    (46-82)  %


 


Lymph % (Auto)    (13-37)  %


 


Mono % (Auto)    (4-12)  %


 


Eos % (Auto)    (1.0-5.0)  %


 


Baso % (Auto)    (0-2)  %


 


Neut # (Auto)    (1.6-8.3)  #


 


Lymph # (Auto)    (0.6-5.0)  #


 


Mono # (Auto)    (0.0-1.3)  #


 


Eos # (Auto)    (0.0-0.8)  #


 


Baso # (Auto)    (0.0-0.2)  #


 


PT  21.8 H   (8.7-11.1)  


 


INR  2.27 H   (0.89-1.13)  


 


Sodium    (135-145)  mmol/L


 


Potassium    (3.5-5.3)  mmol/L


 


Chloride    (100-110)  mmol/L


 


Carbon Dioxide    (21-32)  mmol/L


 


BUN    (7-18)  mg/dL


 


Creatinine    (0.55-1.02)  mg/dL


 


Est Cr Clr Drug Dosing    


 


Estimated GFR (MDRD)    (>60)  


 


BUN/Creatinine Ratio    (9-20)  


 


Glucose    ()  mg/dL


 


Calcium    (8.6-10.2)  mg/dL


 


Total Bilirubin    (0.1-1.3)  mg/dL


 


AST    (5-25)  IU/L


 


ALT    (12-36)  U/L


 


Alkaline Phosphatase    ()  IU/L


 


Troponin I    (<0.017-0.056)  ng/mL


 


Total Protein    (6.0-8.0)  g/dL


 


Albumin    (3.2-4.6)  g/dL


 


Globulin    g/dL


 


Albumin/Globulin Ratio    


 


Urine Color   Yellow  (YELLOW)  


 


Urine Appearance   Clear  (CLEAR)  


 


Urine pH   5.0  (5.0-6.5)  


 


Ur Specific Gravity   1.015  (1.010-1.025)  


 


Urine Protein   30 H  (NEGATIVE)  mg/dL


 


Urine Glucose (UA)   Normal  (NEGATIVE)  mg/dL


 


Urine Ketones   Negative  (NEGATIVE)  mg/dL


 


Urine Occult Blood   Negative  (NEGATIVE)  


 


Urine Nitrite   Negative  (NEGATIVE)  


 


Urine Bilirubin   Negative  (NEGATIVE)  


 


Urine Urobilinogen   Normal  (NEGATIVE)  mg/dL


 


Ur Leukocyte Esterase   Negative  (NEGATIVE)  


 


Urine RBC   0-5  (0)  


 


Urine WBC   0-5  (0)  


 


Ur Squamous Epith Cells   Few H  (NS,R,O)  


 


Urine Bacteria   Rare H  (NS)  











Meds: 


Medications











Generic Name Dose Route Start Last Admin





  Trade Name Freq  PRN Reason Stop Dose Admin


 


Sodium Chloride  1,000 mls @ 500 mls/hr  06/15/18 15:00  06/15/18 15:18





  Normal Saline  IV   500 mls/hr





  ASDIRECTED SILVIO   Administration





     





     





     





     


 


Sodium Chloride  10 ml  06/15/18 14:57  06/15/18 15:10





  Saline Flush  FLUSH   10 ml





  ASDIRECTED PRN   Administration





  Keep Vein Open   





     





     





     














Discontinued Medications














Generic Name Dose Route Start Last Admin





  Trade Name Freq  PRN Reason Stop Dose Admin


 


Ondansetron HCl  4 mg  06/15/18 14:58  06/15/18 15:17





  Zofran  IVPUSH  06/15/18 14:59  4 mg





  ONETIME ONE   Administration





     





     





     





     


 


Pantoprazole Sodium  40 mg  06/15/18 15:08  06/15/18 15:15





  Protonix Iv***  IVPUSH  06/15/18 15:09  40 mg





  ONETIME ONE   Administration





     





     





     





     














Departure





- Departure


Time of Disposition: 17:00


Disposition: DC/Tfer to [facility] 03


Condition: Good


Clinical Impression: 


Gastritis


Qualifiers:


 Gastritis type: unspecified gastritis Chronicity: unspecified Gastritis 

bleeding: without bleeding Qualified Code(s): K29.70 - Gastritis, unspecified, 

without bleeding








- Discharge Information


Referrals: 


Strand,Duane, MD [Primary Care Provider] - 


Forms:  ED Department Discharge





- Problem List & Annotations


(1) Gastritis


SNOMED Code(s): 5656421


   Code(s): K29.70 - GASTRITIS, UNSPECIFIED, WITHOUT BLEEDING   Status: Acute   

Current Visit: Yes   Annotation/Comment:: I suggested [place], hydration, and rest.

    


Qualifiers: 


   Gastritis type: unspecified gastritis   Chronicity: unspecified   Gastritis 

bleeding: without bleeding   Qualified Code(s): K29.70 - Gastritis, unspecified

, without bleeding   





- Problem List Review


Problem List Initiated/Reviewed/Updated: Yes





- My Orders


Last 24 Hours: 


My Active Orders





06/15/18 14:57


Sodium Chloride 0.9% [Saline Flush]   10 ml FLUSH ASDIRECTED PRN 


Peripheral IV Insertion Adult [OM.PC] Routine 


EKG 12 Lead [EK] Routine 





06/15/18 14:58


EKG Documentation Completion [RC] ASDIRECTED 





06/15/18 15:00


Sodium Chloride 0.9% [Normal Saline] 1,000 ml IV ASDIRECTED 





06/15/18 16:15


UA W/MICROSCOPIC [URIN] Stat 














- Assessment/Plan


Last 24 Hours: 


My Active Orders





06/15/18 14:57


Sodium Chloride 0.9% [Saline Flush]   10 ml FLUSH ASDIRECTED PRN 


Peripheral IV Insertion Adult [OM.PC] Routine 


EKG 12 Lead [EK] Routine 





06/15/18 14:58


EKG Documentation Completion [RC] ASDIRECTED 





06/15/18 15:00


Sodium Chloride 0.9% [Normal Saline] 1,000 ml IV ASDIRECTED 





06/15/18 16:15


UA W/MICROSCOPIC [URIN] Stat 











Plan: 





Follow up with PCP if needed.

## 2020-01-17 ENCOUNTER — HOSPITAL ENCOUNTER (INPATIENT)
Dept: HOSPITAL 7 - FB.ED | Age: 85
LOS: 2 days | Discharge: NURSING, REHAB OR LONG-TERM CARE | DRG: 291 | End: 2020-01-19
Attending: FAMILY MEDICINE | Admitting: FAMILY MEDICINE
Payer: MEDICARE

## 2020-01-17 DIAGNOSIS — Z79.890: ICD-10-CM

## 2020-01-17 DIAGNOSIS — Z79.84: ICD-10-CM

## 2020-01-17 DIAGNOSIS — R20.0: ICD-10-CM

## 2020-01-17 DIAGNOSIS — E87.1: ICD-10-CM

## 2020-01-17 DIAGNOSIS — E78.00: ICD-10-CM

## 2020-01-17 DIAGNOSIS — I13.0: Primary | ICD-10-CM

## 2020-01-17 DIAGNOSIS — Z98.49: ICD-10-CM

## 2020-01-17 DIAGNOSIS — Z66: ICD-10-CM

## 2020-01-17 DIAGNOSIS — Z79.52: ICD-10-CM

## 2020-01-17 DIAGNOSIS — M19.90: ICD-10-CM

## 2020-01-17 DIAGNOSIS — Z86.73: ICD-10-CM

## 2020-01-17 DIAGNOSIS — I50.9: ICD-10-CM

## 2020-01-17 DIAGNOSIS — Z95.2: ICD-10-CM

## 2020-01-17 DIAGNOSIS — E66.9: ICD-10-CM

## 2020-01-17 DIAGNOSIS — R32: ICD-10-CM

## 2020-01-17 DIAGNOSIS — J45.41: ICD-10-CM

## 2020-01-17 DIAGNOSIS — Z88.1: ICD-10-CM

## 2020-01-17 DIAGNOSIS — Z90.721: ICD-10-CM

## 2020-01-17 DIAGNOSIS — J18.9: ICD-10-CM

## 2020-01-17 DIAGNOSIS — D64.9: ICD-10-CM

## 2020-01-17 DIAGNOSIS — E11.9: ICD-10-CM

## 2020-01-17 DIAGNOSIS — J45.901: ICD-10-CM

## 2020-01-17 DIAGNOSIS — Z90.710: ICD-10-CM

## 2020-01-17 DIAGNOSIS — N18.3: ICD-10-CM

## 2020-01-17 DIAGNOSIS — I11.0: ICD-10-CM

## 2020-01-17 DIAGNOSIS — Z79.51: ICD-10-CM

## 2020-01-17 DIAGNOSIS — Z86.718: ICD-10-CM

## 2020-01-17 DIAGNOSIS — K57.90: ICD-10-CM

## 2020-01-17 DIAGNOSIS — Z79.899: ICD-10-CM

## 2020-01-17 DIAGNOSIS — E03.9: ICD-10-CM

## 2020-01-17 DIAGNOSIS — E11.22: ICD-10-CM

## 2020-01-17 DIAGNOSIS — Z51.5: ICD-10-CM

## 2020-01-17 DIAGNOSIS — Z79.01: ICD-10-CM

## 2020-01-17 DIAGNOSIS — K21.9: ICD-10-CM

## 2020-01-17 DIAGNOSIS — Z90.49: ICD-10-CM

## 2020-01-17 PROCEDURE — 93005 ELECTROCARDIOGRAM TRACING: CPT

## 2020-01-17 PROCEDURE — 85730 THROMBOPLASTIN TIME PARTIAL: CPT

## 2020-01-17 PROCEDURE — 82803 BLOOD GASES ANY COMBINATION: CPT

## 2020-01-17 PROCEDURE — 83880 ASSAY OF NATRIURETIC PEPTIDE: CPT

## 2020-01-17 PROCEDURE — 81001 URINALYSIS AUTO W/SCOPE: CPT

## 2020-01-17 PROCEDURE — 94640 AIRWAY INHALATION TREATMENT: CPT

## 2020-01-17 PROCEDURE — 83605 ASSAY OF LACTIC ACID: CPT

## 2020-01-17 PROCEDURE — 87040 BLOOD CULTURE FOR BACTERIA: CPT

## 2020-01-17 PROCEDURE — 80053 COMPREHEN METABOLIC PANEL: CPT

## 2020-01-17 PROCEDURE — 94660 CPAP INITIATION&MGMT: CPT

## 2020-01-17 PROCEDURE — 85025 COMPLETE CBC W/AUTO DIFF WBC: CPT

## 2020-01-17 PROCEDURE — 84484 ASSAY OF TROPONIN QUANT: CPT

## 2020-01-17 PROCEDURE — 71045 X-RAY EXAM CHEST 1 VIEW: CPT

## 2020-01-17 PROCEDURE — 36415 COLL VENOUS BLD VENIPUNCTURE: CPT

## 2020-01-17 PROCEDURE — 85379 FIBRIN DEGRADATION QUANT: CPT

## 2020-01-17 PROCEDURE — 87804 INFLUENZA ASSAY W/OPTIC: CPT

## 2020-01-17 PROCEDURE — 85610 PROTHROMBIN TIME: CPT

## 2020-01-17 PROCEDURE — 71275 CT ANGIOGRAPHY CHEST: CPT

## 2020-01-17 RX ADMIN — Medication PRN ML: at 21:53

## 2020-01-17 RX ADMIN — Medication PRN ML: at 17:31

## 2020-01-17 RX ADMIN — Medication PRN ML: at 07:54

## 2020-01-17 RX ADMIN — SODIUM CHLORIDE SCH MG: 9 INJECTION, SOLUTION INTRAVENOUS at 23:30

## 2020-01-17 RX ADMIN — Medication PRN ML: at 23:30

## 2020-01-17 RX ADMIN — BUDESONIDE SCH MG: 0.5 SUSPENSION RESPIRATORY (INHALATION) at 20:15

## 2020-01-17 RX ADMIN — Medication PRN ML: at 11:20

## 2020-01-17 RX ADMIN — SODIUM CHLORIDE SCH MG: 9 INJECTION, SOLUTION INTRAVENOUS at 13:09

## 2020-01-17 RX ADMIN — BUDESONIDE SCH MG: 0.5 SUSPENSION RESPIRATORY (INHALATION) at 13:08

## 2020-01-17 RX ADMIN — SODIUM CHLORIDE SCH MG: 9 INJECTION, SOLUTION INTRAVENOUS at 17:30

## 2020-01-17 NOTE — PCM.HP.2
H&P History of Present Illness





- General


Date of Service: 20


Admit Problem/Dx: 


 Admission Diagnosis/Problem





Admission Diagnosis/Problem      Pneumonia








Source of Information: Patient, EMS Notes Reviewed


History Limitations: Reports: No Limitations





- History of Present Illness


Initial Comments - Free Text/Narative: 


This is a very pleasant 85-year-old female patient states she got acutely short 

of breath this morning and was brought over here by ambulance. She said last 

weekend which is about a week ago she started getting a cold with nasal 

congestion and a productive cough. The clinic call over doxycycline and 

prednisone. Over the week she seemed to get worse and he last night started 

having shortness of breath and got worse today and was seen in the ER. In the 

ER they put her on BiPAP for a while gave her some Lasix and she got better. 

They did a CT scan that showed pulmonary vascular congestion and pneumonia. She 

did have a flu shot this year. She denies fevers, chills, runny nose or 

congestion at this time per she has no ear pain, sore throat. She does have 

wheezing and little shortness of breath. She is much better and oxygen. She 

says she's a 6 out of 10 from she normally feels.








- Related Data


Allergies/Adverse Reactions: 


 Allergies











Allergy/AdvReac Type Severity Reaction Status Date / Time


 


ciprofloxacin [From Cipro] Allergy  Rash Verified 06/15/18 17:27


 


ciprofloxacin HCl Allergy  Rash Verified 06/15/18 17:27





[From Cipro]     











Home Medications: 


 Home Meds





Triamcinolone Acetonide [Kenalog 0.1% Crm] 1 applic TOP BID PRN 10/30/13 [

History]


Warfarin [Coumadin] 2.5 mg PO SUTUWETHSA 10/23/14 [History]


Levothyroxine [Synthroid] 88 mcg PO SUTUWETHSA 05/15/17 [History]


Ferrous Gluconate 324 mg PO DAILY@1200 18 [History]


Budesonide [Pulmicort] 0.5 mg IH BID 06/15/18 [History]


Warfarin [Coumadin] 5 mg PO MOFR 06/15/18 [History]


carvediloL [Carvedilol] 3.125 mg PO BIDMEALS 06/15/18 [History]


Acetaminophen [Tylenol] 650 mg PO BEDTIME 20 [History]


Acetaminophen [Tylenol] 650 mg PO Q4H PRN 20 [History]


Alum Hydrox/Mag Hydrox/Simeth [Maalox Advanced] 10 ml PO ASDIRECTED PRN  [History]


Benzonatate 100 mg PO TID PRN 20 [History]


Calcium Carbonate/Vitamin D3 [Calcium 600-Vit D3 400 Tablet] 1 tab PO BID  [History]


Carboxymethylcellulose Sodium [Artificial Tears] 1 drop EYEBOTH BID PRN  [History]


Docusate Sodium 100 mg PO Q48H 20 [History]


Doxycycline [Vibramycin] 100 mg PO BID 20 [History]


Furosemide [Lasix] 20 mg PO Q48H 20 [History]


Ketotifen Fumarate [Zaditor] 1 drop EYEBOTH BID PRN 20 [History]


Levothyroxine [Synthroid] 176 mcg PO MOFR 20 [History]


Lisinopril [Zestril] 5 mg PO DAILY 20 [History]


Magnesium Oxide [Magnesium] 400 mg PO DAILY 20 [History]


Multivit-Min/FA/Lycopene/Lut [Senior Tabs] 1 tab PO DAILY 20 [History]


Nystatin 1 applic TOP BID PRN 20 [History]


Omeprazole 20 mg PO DAILY 20 [History]


Rosuvastatin [Crestor] 5 mg PO DAILY 20 [History]


Sodium Chloride [Saline Nasal Spray] 1 spray NASBOTH BID 20 [History]


Sodium Chloride [Saline Nasal Spray] 1 spray NASBOTH BID PRN 20 [History]


guaiFENesin [Tussin] 200 mg PO Q4H 20 [History]


metFORMIN HCl [Glucophage] 500 mg PO BIDMEALS 20 [History]


predniSONE [Prednisone] 40 mg PO DAILY 20 [History]











Past Medical History


HEENT History: Reports: Cataract, Glaucoma, Impaired Vision


Cardiovascular History: Reports: Blood Clots/VTE/DVT, Heart Failure, Heart 

Murmur, Heart Valve Replacement, High Cholesterol, Hypertension, Other (See 

Below) (POST OP INTRAVASCULAR AORTIC VALVE REPLACEMENT)


Respiratory History: Reports: Asthma


Gastrointestinal History: Reports: Diverticulosis, GERD


Genitourinary History: Reports: Urinary Incontinence, Other (See Below)


Other Genitourinary History: A&P REPAIR,  DYSFUNCTIONAL UTERINE BLEEDING.


OB/GYN History: Reports: Dysfunctional Uterine Bleeding, Pregnancy


Other OB/BYN History:  II PARA II


Musculoskeletal History: Reports: Osteoarthritis


Neurological History: Reports: CVA


Psychiatric History: Reports: None


Endocrine/Metabolic History: Reports: Diabetes, Type II, Hypothyroidism, Obesity

/BMI 30+


Hematologic History: Reports: Anemia


Other Hematologic History: POST SURGICAL FROM DUB


Immunologic History: Reports: None


Oncologic (Cancer) History: Reports: None


Dermatologic History: Reports: Venous Stasis Dermatitis





- Infectious Disease History


Infectious Disease History: Reports: Chicken Pox, Measles, Mumps





- Past Surgical History


Head Surgeries/Procedures: Reports: None


HEENT Surgical History: Reports: Cataract Surgery


GI Surgical History: Reports: Appendectomy, Cholecystectomy, Colon, Colonoscopy

, Hernia, Abdominal, Hernia Repair/Other


Female  Surgical History: Reports: D&C, Hysterectomy, Oophorectomy, Salpingo-

Oophorectomy





Social & Family History





- Family History


Other HEENT Family History: SEE HX





- Tobacco Use


Smoking Status *Q: Never Smoker





- Caffeine Use


Caffeine Use: Reports: Coffee


Other Caffeine Use: 2 cups a day


Caffeine Use Comment: 1 cup coffee per day.





- Recreational Drug Use


Recreational Drug Use: No





H&P Review of Systems





- Review of Systems:


Review Of Systems: See Below


General: Reports: No Symptoms


HEENT: Reports: No Symptoms


Pulmonary: Reports: Shortness of Breath, Wheezing, Cough, Sputum.  Denies: 

Hemoptysis


Cardiovascular: Reports: No Symptoms


Gastrointestinal: Reports: No Symptoms


Genitourinary: Reports: No Symptoms


Musculoskeletal: Reports: No Symptoms


Skin: Reports: No Symptoms


Psychiatric: Reports: No Symptoms


Neurological: Reports: No Symptoms


Hematologic/Lymphatic: Reports: No Symptoms


Immunologic: Reports: No Symptoms





Exam





- Exam


Exam: See Below





- Vital Signs


Vital Signs: 


 Last Vital Signs











Temp  96.9 F   20 07:09


 


Pulse  80   20 13:20


 


Resp  29 H  20 07:09


 


BP  160/91 H  20 13:07


 


Pulse Ox  99   20 13:20











Weight: 168 lb 8 oz





- Exam


General: Alert, Oriented, Cooperative


HEENT: Mucosa Moist & Sparks, Posterior Pharynx Clear, TMs Clear


Neck: Supple, Trachea Midline


Lungs: Normal Respiratory Effort, Wheezing (Expiratory throughout bilateral)


Cardiovascular: Regular Rate, Regular Rhythm.  No: Systolic Murmur


Back Exam: Normal Inspection, Full Range of Motion


Extremities: Normal Inspection, Non-Tender, No Pedal Edema


Skin: Warm, Dry, Intact


Neuro Extensive - Mental Status: Alert, Oriented x3, Normal Mood/Affect, Normal 

Cognition


Psychiatric: Alert, Normal Affect, Normal Mood





- Patient Data


Lab Results Last 24 hrs: 


 Laboratory Results - last 24 hr











  20 Range/Units





  07:30 07:30 07:30 


 


WBC  17.0 H    (4.5-12.0)  X10-3/uL


 


RBC  3.43    (3.23-5.20)  x10(6)uL


 


Hgb  10.2 L    (11.5-15.5)  g/dL


 


Hct  30.6    (30.0-51.3)  %


 


MCV  89.3    (80-96)  fL


 


MCH  29.8    (27.7-33.6)  pg


 


MCHC  33.4    (32.2-35.4)  g/dL


 


RDW  14.1    (11.5-15.5)  %


 


Plt Count  315    (125-369)  X10(3)uL


 


MPV  8.5    (7.4-10.4)  fL


 


Add Manual Diff  Yes    


 


Neutrophils % (Manual)  79    (46-82)  %


 


Band Neutrophils %  6    (0-6)  %


 


Lymphocytes % (Manual)  9 L    (13-37)  %


 


Monocytes % (Manual)  6    (4-12)  %


 


PT     (8.7-11.1)  


 


INR     (0.89-1.13)  


 


APTT     (24.4-33.2)  SECONDS


 


D-Dimer, Quantitative     (0.0-0.59)  mg/LFEU


 


POC VBG pH     (7.31-7.41)  


 


POC VBG pCO2     (41-51)  mmHG


 


POC VBG HCO3     (23-28)  mmol/L


 


POC VBG Total CO2     (24-29)  mmol/L


 


POC VBG Base Excess     (-2-3)  mmol/L


 


Sodium   133 L   (135-145)  mmol/L


 


Potassium   4.2   (3.5-5.3)  mmol/L


 


Chloride   97 L   (100-110)  mmol/L


 


Carbon Dioxide   27   (21-32)  mmol/L


 


BUN   28 H   (7-18)  mg/dL


 


Creatinine   1.3 H   (0.55-1.02)  mg/dL


 


Est Cr Clr Drug Dosing   TNP   


 


Estimated GFR (MDRD)   39 L   (>60)  


 


BUN/Creatinine Ratio   21.5 H   (9-20)  


 


Glucose   211 H   ()  mg/dL


 


Lactic Acid    1.2  (0.4-2.0)  mmol/L


 


Calcium   9.3   (8.6-10.2)  mg/dL


 


Total Bilirubin   0.4   (0.1-1.3)  mg/dL


 


AST   30 H D   (5-25)  IU/L


 


ALT   15  D   (12-36)  U/L


 


Alkaline Phosphatase   79   ()  IU/L


 


Troponin I     (<0.017-0.056)  ng/mL


 


NT-Pro-B Natriuret Pep     (<=450)  pg/mL


 


Total Protein   8.2 H   (6.0-8.0)  g/dL


 


Albumin   3.9   (3.2-4.6)  g/dL


 


Globulin   4.3   g/dL


 


Albumin/Globulin Ratio   0.9   


 


Urine Color     (YELLOW)  


 


Urine Appearance     (CLEAR)  


 


Urine pH     (5.0-6.5)  


 


Ur Specific Gravity     (1.010-1.025)  


 


Urine Protein     (NEGATIVE)  mg/dL


 


Urine Glucose (UA)     (NORMAL)  mg/dL


 


Urine Ketones     (NEGATIVE)  mg/dL


 


Urine Occult Blood     (NEGATIVE)  


 


Urine Nitrite     (NEGATIVE)  


 


Urine Bilirubin     (NEGATIVE)  


 


Urine Urobilinogen     (NEGATIVE)  mg/dL


 


Ur Leukocyte Esterase     (NEGATIVE)  


 


Urine RBC     (0-5)  


 


Urine WBC     (0-5)  


 


Ur Squamous Epith Cells     (NS,R,O)  


 


Amorphous Sediment     


 


Urine Bacteria     (NS)  














  20 Range/Units





  07:30 07:30 07:40 


 


WBC     (4.5-12.0)  X10-3/uL


 


RBC     (3.23-5.20)  x10(6)uL


 


Hgb     (11.5-15.5)  g/dL


 


Hct     (30.0-51.3)  %


 


MCV     (80-96)  fL


 


MCH     (27.7-33.6)  pg


 


MCHC     (32.2-35.4)  g/dL


 


RDW     (11.5-15.5)  %


 


Plt Count     (125-369)  X10(3)uL


 


MPV     (7.4-10.4)  fL


 


Add Manual Diff     


 


Neutrophils % (Manual)     (46-82)  %


 


Band Neutrophils %     (0-6)  %


 


Lymphocytes % (Manual)     (13-37)  %


 


Monocytes % (Manual)     (4-12)  %


 


PT  31.2 H    (8.7-11.1)  


 


INR  3.26 H    (0.89-1.13)  


 


APTT  31.0    (24.4-33.2)  SECONDS


 


D-Dimer, Quantitative  1.70 H    (0.0-0.59)  mg/LFEU


 


POC VBG pH     (7.31-7.41)  


 


POC VBG pCO2     (41-51)  mmHG


 


POC VBG HCO3     (23-28)  mmol/L


 


POC VBG Total CO2     (24-29)  mmol/L


 


POC VBG Base Excess     (-2-3)  mmol/L


 


Sodium     (135-145)  mmol/L


 


Potassium     (3.5-5.3)  mmol/L


 


Chloride     (100-110)  mmol/L


 


Carbon Dioxide     (21-32)  mmol/L


 


BUN     (7-18)  mg/dL


 


Creatinine     (0.55-1.02)  mg/dL


 


Est Cr Clr Drug Dosing     


 


Estimated GFR (MDRD)     (>60)  


 


BUN/Creatinine Ratio     (9-20)  


 


Glucose     ()  mg/dL


 


Lactic Acid     (0.4-2.0)  mmol/L


 


Calcium     (8.6-10.2)  mg/dL


 


Total Bilirubin     (0.1-1.3)  mg/dL


 


AST     (5-25)  IU/L


 


ALT     (12-36)  U/L


 


Alkaline Phosphatase     ()  IU/L


 


Troponin I   0.046   (<0.017-0.056)  ng/mL


 


NT-Pro-B Natriuret Pep   17875 H*   (<=450)  pg/mL


 


Total Protein     (6.0-8.0)  g/dL


 


Albumin     (3.2-4.6)  g/dL


 


Globulin     g/dL


 


Albumin/Globulin Ratio     


 


Urine Color    Yellow  (YELLOW)  


 


Urine Appearance    Slightly cloudy  (CLEAR)  


 


Urine pH    5.0  (5.0-6.5)  


 


Ur Specific Gravity    1.020  (1.010-1.025)  


 


Urine Protein    500 H  (NEGATIVE)  mg/dL


 


Urine Glucose (UA)    Normal  (NORMAL)  mg/dL


 


Urine Ketones    Negative  (NEGATIVE)  mg/dL


 


Urine Occult Blood    Moderate H  (NEGATIVE)  


 


Urine Nitrite    Negative  (NEGATIVE)  


 


Urine Bilirubin    Negative  (NEGATIVE)  


 


Urine Urobilinogen    Normal  (NEGATIVE)  mg/dL


 


Ur Leukocyte Esterase    Negative  (NEGATIVE)  


 


Urine RBC    5-10 H  (0-5)  


 


Urine WBC    0-5  (0-5)  


 


Ur Squamous Epith Cells    Occasional  (NS,R,O)  


 


Amorphous Sediment    Few  


 


Urine Bacteria    Moderate H  (NS)  














  20 Range/Units





  07:57 


 


WBC   (4.5-12.0)  X10-3/uL


 


RBC   (3.23-5.20)  x10(6)uL


 


Hgb   (11.5-15.5)  g/dL


 


Hct   (30.0-51.3)  %


 


MCV   (80-96)  fL


 


MCH   (27.7-33.6)  pg


 


MCHC   (32.2-35.4)  g/dL


 


RDW   (11.5-15.5)  %


 


Plt Count   (125-369)  X10(3)uL


 


MPV   (7.4-10.4)  fL


 


Add Manual Diff   


 


Neutrophils % (Manual)   (46-82)  %


 


Band Neutrophils %   (0-6)  %


 


Lymphocytes % (Manual)   (13-37)  %


 


Monocytes % (Manual)   (4-12)  %


 


PT   (8.7-11.1)  


 


INR   (0.89-1.13)  


 


APTT   (24.4-33.2)  SECONDS


 


D-Dimer, Quantitative   (0.0-0.59)  mg/LFEU


 


POC VBG pH  7.29 L  (7.31-7.41)  


 


POC VBG pCO2  57.1 H  (41-51)  mmHG


 


POC VBG HCO3  27.4  (23-28)  mmol/L


 


POC VBG Total CO2  29  (24-29)  mmol/L


 


POC VBG Base Excess  1  (-2-3)  mmol/L


 


Sodium   (135-145)  mmol/L


 


Potassium   (3.5-5.3)  mmol/L


 


Chloride   (100-110)  mmol/L


 


Carbon Dioxide   (21-32)  mmol/L


 


BUN   (7-18)  mg/dL


 


Creatinine   (0.55-1.02)  mg/dL


 


Est Cr Clr Drug Dosing   


 


Estimated GFR (MDRD)   (>60)  


 


BUN/Creatinine Ratio   (9-20)  


 


Glucose   ()  mg/dL


 


Lactic Acid   (0.4-2.0)  mmol/L


 


Calcium   (8.6-10.2)  mg/dL


 


Total Bilirubin   (0.1-1.3)  mg/dL


 


AST   (5-25)  IU/L


 


ALT   (12-36)  U/L


 


Alkaline Phosphatase   ()  IU/L


 


Troponin I   (<0.017-0.056)  ng/mL


 


NT-Pro-B Natriuret Pep   (<=450)  pg/mL


 


Total Protein   (6.0-8.0)  g/dL


 


Albumin   (3.2-4.6)  g/dL


 


Globulin   g/dL


 


Albumin/Globulin Ratio   


 


Urine Color   (YELLOW)  


 


Urine Appearance   (CLEAR)  


 


Urine pH   (5.0-6.5)  


 


Ur Specific Gravity   (1.010-1.025)  


 


Urine Protein   (NEGATIVE)  mg/dL


 


Urine Glucose (UA)   (NORMAL)  mg/dL


 


Urine Ketones   (NEGATIVE)  mg/dL


 


Urine Occult Blood   (NEGATIVE)  


 


Urine Nitrite   (NEGATIVE)  


 


Urine Bilirubin   (NEGATIVE)  


 


Urine Urobilinogen   (NEGATIVE)  mg/dL


 


Ur Leukocyte Esterase   (NEGATIVE)  


 


Urine RBC   (0-5)  


 


Urine WBC   (0-5)  


 


Ur Squamous Epith Cells   (NS,R,O)  


 


Amorphous Sediment   


 


Urine Bacteria   (NS)  











Result Diagrams: 


 20 07:30





 20 07:30


Alonso Results Last 24 hrs: 


 Microbiology











 20 07:26 Influenza Type A Antigen Screen - Final





 Nasopharyngeal Swab    NEGATIVE INFLUENZA A VIRUS AG





    REFERENCE RANGE: NEGATIVE





 Influenza Type B Antigen Screen - Final





    NEGATIVE INFLUENZA B VIRUS AG





    REFERENCE RANGE: NEGATIVE














Sepsis Event Note





- Evaluation


Sepsis Screening Result: No Definite Risk





- Focused Exam


Vital Signs: 


 Vital Signs











  Temp Pulse Pulse Resp BP BP Pulse Ox


 


 20 13:20    80    


 


 20 13:07   90    160/91 H  


 


 20 11:20        99


 


 20 09:00       


 


 20 08:24       


 


 20 07:25       


 


 20 07:09  96.9 F   124 H  29 H   208/136 H  93 L














  Pulse Ox Pulse Ox Pulse Ox


 


 20 13:20    99


 


 20 13:07   


 


 20 11:20   99 


 


 20 09:00   100 


 


 20 08:24    100


 


 20 07:25  100  


 


 20 07:09   











Date Exam was Performed: 20


Time Exam was Performed: 14:39





*Q Meaningful Use (ADM)





- VTE *Q


VTE Pharmacological Contraindications *Q: High INR Value





- Problem List


(1) Acute exacerbation of congestive heart failure


SNOMED Code(s): 096956830, 00785425538916


   ICD Code: I50.9 - HEART FAILURE, UNSPECIFIED   Status: Acute   Current Visit

: Yes   


Qualifiers: 


   Heart failure type: unspecified   Qualified Code(s): I50.9 - Heart failure, 

unspecified   





(2) Exacerbation of asthma


SNOMED Code(s): 615769461


   ICD Code: J45.901 - UNSPECIFIED ASTHMA WITH (ACUTE) EXACERBATION   Status: 

Acute   Current Visit: Yes   


Qualifiers: 


   Asthma severity: moderate   Asthma persistence: persistent   Qualified Code(s

): J45.41 - Moderate persistent asthma with (acute) exacerbation   





(3) Pneumonia


SNOMED Code(s): 911278869


   ICD Code: J18.9 - PNEUMONIA, UNSPECIFIED ORGANISM   Status: Acute   Current 

Visit: Yes   


Qualifiers: 


   Pneumonia type: due to unspecified organism   Laterality: bilateral   Lung 

location: lower lobe of lung   Qualified Code(s): J18.9 - Pneumonia, 

unspecified organism   





(4) Respiratory distress


SNOMED Code(s): 403183840


   ICD Code: R06.03 - ACUTE RESPIRATORY DISTRESS   Status: Acute   Current Visit

: Yes   





(5) CKD (chronic kidney disease), stage III


SNOMED Code(s): 936028204


   ICD Code: N18.3 - CHRONIC KIDNEY DISEASE, STAGE 3 (MODERATE)   Status: Acute

   Current Visit: No   Problem Details: Baseline creatinine is 1.4 range which 

is where she was here. Continue to monitor.   





(6) Palliative care status


SNOMED Code(s): 845280539


   ICD Code: Z51.5 - ENCOUNTER FOR PALLIATIVE CARE   Status: Acute   Current 

Visit: No   





(7) Hyponatremia


SNOMED Code(s): 05497217


   ICD Code: E87.1 - HYPO-OSMOLALITY AND HYPONATREMIA   Status: Acute   Current 

Visit: Yes   


Problem List Initiated/Reviewed/Updated: Yes


Orders Last 24hrs: 


 Active Orders 24 hr











 Category Date Time Status


 


 Admission Status [Patient Status] [ADT] Routine ADT  20 09:25 Active


 


 BIPAP Adult [RT BiPAP/CPAP] [RC] ASDIRECTED Care  20 07:20 Active


 


 Cardiac Monitoring [RC] CONTINUOUS Care  20 09:49 Active


 


 EKG Documentation Completion [RC] ASDIRECTED Care  20 07:11 Active


 


 Height and Weight [RC] DAILY Care  20 09:48 Active


 


 Insert Villar Catheter [Insert Urinary Catheter] [OM.PC] Care  20 07:15 

Ordered





 Q24H   


 


 Intake and Output [RC] QSHIFT Care  20 09:49 Active


 


 Notify Provider Vital Signs [RC] ASDIRECTED Care  20 09:49 Active


 


 Oxygen Therapy [RC] PRN Care  20 09:48 Active


 


 Pulse Oximetry [RC] CONTINUOUS Care  20 09:49 Active


 


 RT Aerosol Therapy [RC] ASDIRECTED Care  20 07:21 Active


 


 RT Aerosol Therapy [RC] ASDIRECTED Care  20 09:51 Active


 


 Up ad Radha [RC] ASDIRECTED Care  20 09:48 Active


 


 Urinary Catheter Assessment [RC] QSHIFT Care  20 07:14 Active


 


 VTE/DVT Education [RC] Per Unit Routine Care  20 09:48 Active


 


 Vital Signs [RC] QSHIFT Care  20 09:48 Active


 


 Heart Healthy Diet [DIET] Diet  20 Lunch Active


 


 Ang Chest [CT] Stat Exams  20 08:18 Taken


 


 CXR [Chest 1V Frontal] [CR] Stat Exams  20 07:10 Taken


 


 BASIC METABOLIC PANEL,BMP [CHEM] AM Lab  20 05:11 Ordered


 


 CBC WITH AUTO DIFF [HEME] AM Lab  20 05:11 Ordered


 


 CULTURE BLOOD [BC] Urgent Lab  20 07:30 Received


 


 CULTURE BLOOD [BC] Urgent Lab  20 07:40 Received


 


 INR,PT,PROTHROMBIN TIME [COAG] AM Lab  20 05:11 Ordered


 


 INR,PT,PROTHROMBIN TIME [COAG] DAILY Lab  20 06:00 Ordered


 


 INR,PT,PROTHROMBIN TIME [COAG] DAILY Lab  20 06:00 Ordered


 


 INR,PT,PROTHROMBIN TIME [COAG] DAILY Lab  20 06:00 Ordered


 


 INR,PT,PROTHROMBIN TIME [COAG] DAILY Lab  20 06:00 Ordered


 


 INR,PT,PROTHROMBIN TIME [COAG] DAILY Lab  20 06:00 Ordered


 


 Acetaminophen [Tylenol] Med  20 09:54 Active





 650 mg PO Q4H PRN   


 


 Albuterol [Proventil Neb Soln] Med  20 09:48 Active





 2.5 mg NEB Q2H PRN   


 


 Albuterol/Ipratropium [DuoNeb 3.0-0.5 MG/3 ML] Med  20 11:00 Active





 3 ml INH QIDRT   


 


 Benzonatate [Tessalon Perles] Med  20 09:54 Active





 100 mg PO TID PRN   


 


 Budesonide [Pulmicort] Med  20 10:00 Active





 0.5 mg NEB BID   


 


 Docusate Sodium [Colace] Med  20 10:00 Active





 100 mg PO Q48H   


 


 Furosemide [Lasix] Med  20 09:00 Active





 40 mg IVPUSH DAILY   


 


 Levothyroxine [Synthroid] Med  20 10:00 Active





 176 mcg PO MoFr@0600   


 


 Levothyroxine [Synthroid] Med  20 06:00 Active





 88 mcg PO SUTUWETHSA   


 


 Magnesium Oxide Med  20 10:30 Active





 400 mg PO DAILY   


 


 Pantoprazole [ProTONIX***] Med  20 10:00 Active





 40 mg PO DAILY   


 


 Piperacillin/Tazobactam [Zosyn] 3.375 gm Med  20 16:00 Active





 Sodium Chloride 0.9% [Normal Saline] 50 ml   





 IV Q6H   


 


 Rosuvastatin [Crestor] Med  20 10:00 Active





 5 mg PO DAILY   


 


 Sodium Chloride 0.9% [Saline Flush] Med  20 07:21 Active





 10 ml FLUSH ASDIRECTED PRN   


 


 Warfarin Sliding Scale [Coumadin Sliding Scale] Med  20 10:45 Pending





 1 each PO ASDIRECTED   


 


 carvediloL [Coreg] Med  20 10:00 Active





 3.125 mg PO BIDMEALS   


 


 lisinopriL [Prinivil] Med  20 10:00 Active





 5 mg PO DAILY   


 


 metFORMIN [Glucophage] Med  20 18:00 Hold





 500 mg PO BIDMEALS   


 


 methylPREDNISolone Sod Succ [Solu-MEDROL] Med  20 12:00 Active





 60 mg IVPUSH Q6H   


 


 Blood Culture x2 Reflex Set [OM.PC] Urgent Oth  20 07:13 Ordered


 


 Saline Lock Insert [OM.PC] Routine Oth  20 07:21 Ordered


 


 VTE Pharmacological Contraindications [AST] Per Unit Oth  20 09:48 

Ordered





 Routine   


 


 Resuscitation Status Routine Resus Stat  20 09:48 Ordered


 


 EKG 12 Lead [EK] Stat Ther  20 07:10 Ordered








 Medication Orders





Acetaminophen (Tylenol)  650 mg PO Q4H PRN


   PRN Reason: Pain/Fever


Albuterol (Proventil Neb Soln)  2.5 mg NEB Q2H PRN


   PRN Reason: Shortness Of Breath/wheezing


Albuterol/Ipratropium (Duoneb 3.0-0.5 Mg/3 Ml)  3 ml INH QIDRT [facility]


   Last Admin: 20 13:08  Dose: 3 ml


Benzonatate (Tessalon Perles)  100 mg PO TID PRN


   PRN Reason: Cough


Budesonide (Pulmicort)  0.5 mg NEB BID [facility]


   Last Admin: 20 13:08  Dose: 0.5 mg


Carvedilol (Coreg)  3.125 mg PO BIDMEALS [facility]


   Last Admin: 20 13:07  Dose: 3.125 mg


Docusate Sodium (Colace)  100 mg PO Q48H [facility]


   Last Admin: 20 13:07  Dose: 100 mg


Furosemide (Lasix)  40 mg IVPUSH DAILY [facility]


Piperacillin Sod/Tazobactam (Sod 3.375 gm/ Sodium Chloride)  50 mls @ 100 mls/

hr IV Q6H [facility]


Levothyroxine Sodium (Synthroid)  88 mcg PO SUTUWETHSA [facility]


Levothyroxine Sodium (Synthroid)  176 mcg PO MoFr@0600 [facility]


Lisinopril (Prinivil)  5 mg PO DAILY [facility]


   Last Admin: 20 13:07  Dose: 5 mg


Magnesium Oxide (Magnesium Oxide)  400 mg PO DAILY [facility]


   Last Admin: 20 13:08  Dose: 400 mg


Metformin HCl (Glucophage)  500 mg PO BIDMEALS [facility]


Methylprednisolone Sodium Succinate (Solu-Medrol)  60 mg IVPUSH Q6H [facility]


   Last Admin: 20 13:09  Dose: 60 mg


Pantoprazole Sodium (Protonix***)  40 mg PO DAILY [facility]


   Last Admin: 20 13:08  Dose: 40 mg


Rosuvastatin Calcium (Crestor)  5 mg PO DAILY [facility]


   Last Admin: 20 13:07  Dose: 5 mg


Sodium Chloride (Saline Flush)  10 ml FLUSH ASDIRECTED PRN


   PRN Reason: Keep Vein Open


   Last Admin: 20 07:54  Dose: 10 ml


Warfarin Sodium (Coumadin Sliding Scale)  1 each PO ASDIRECTED [facility]








Assessment/Plan Comment:: 


1. Admit to inpatient


2. Zosyn for pneumonia


3. IV Lasix for CHF


4. Villar catheter in place. Hopefully can remove tomorrow.


5. IV


6. Low-salt diet


7. For DVT prophylaxis she is on Coumadin


8. She elects to be a full code.


9. O2 to keep sats greater than 90%.


10. Up with assist. She normally is in a wheelchair.








- Mortality Measure


Prognosis:: Good

## 2020-01-17 NOTE — EDM.PDOC
ED HPI GENERAL MEDICAL PROBLEM





- General


Chief Complaint: Respiratory Problem


Stated Complaint: SOB


Time Seen by Provider: 20 07:10


Source of Information: Reports: Patient, EMS


History Limitations: Reports: No Limitations





- History of Present Illness


INITIAL COMMENTS - FREE TEXT/NARRATIVE: 





Patient with a h/o Asthma, CHF, HTN, prosthetic heart valve, and T2DM presents 

with SOB since last night. Was treated in clinic on 20 for Asthma 

exacerbation and prescribed Prednisone and Vibramycin. Denies chest pain. 

Nursing home RN noted /118, , Sa02 79% on 2L.


Onset Date: 20





- Related Data


 Allergies











Allergy/AdvReac Type Severity Reaction Status Date / Time


 


ciprofloxacin [From Cipro] Allergy  Rash Verified 06/15/18 17:27


 


ciprofloxacin HCl Allergy  Rash Verified 06/15/18 17:27





[From Cipro]     











Home Meds: 


 Home Meds





Triamcinolone Acetonide [Kenalog 0.1% Crm] 1 applic TOP BID PRN 10/30/13 [

History]


Warfarin [Coumadin] 2.5 mg PO SUTUWETHSA 10/23/14 [History]


Levothyroxine [Synthroid] 88 mcg PO SUTUWETHSA 05/15/17 [History]


Ferrous Gluconate 324 mg PO DAILY@1200 18 [History]


Budesonide [Pulmicort] 0.5 mg IH BID 06/15/18 [History]


Warfarin [Coumadin] 5 mg PO MOFR 06/15/18 [History]


carvediloL [Carvedilol] 3.125 mg PO BIDMEALS 06/15/18 [History]


Acetaminophen [Tylenol] 650 mg PO BEDTIME 20 [History]


Acetaminophen [Tylenol] 650 mg PO Q4H PRN 20 [History]


Alum Hydrox/Mag Hydrox/Simeth [Maalox Advanced] 10 ml PO ASDIRECTED PRN  [History]


Benzonatate 100 mg PO TID PRN 20 [History]


Calcium Carbonate/Vitamin D3 [Calcium 600-Vit D3 400 Tablet] 1 tab PO BID  [History]


Carboxymethylcellulose Sodium [Artificial Tears] 1 drop EYEBOTH BID PRN  [History]


Docusate Sodium 100 mg PO Q48H 20 [History]


Doxycycline [Vibramycin] 100 mg PO BID 20 [History]


Furosemide [Lasix] 20 mg PO Q48H 20 [History]


Ketotifen Fumarate [Zaditor] 1 drop EYEBOTH BID PRN 20 [History]


Levothyroxine [Synthroid] 176 mcg PO MOFR 20 [History]


Lisinopril [Zestril] 5 mg PO DAILY 20 [History]


Magnesium Oxide [Magnesium] 400 mg PO DAILY 20 [History]


Multivit-Min/FA/Lycopene/Lut [Senior Tabs] 1 tab PO DAILY 20 [History]


Nystatin 1 applic TOP BID PRN 20 [History]


Omeprazole 20 mg PO DAILY 20 [History]


Rosuvastatin [Crestor] 5 mg PO DAILY 20 [History]


Sodium Chloride [Saline Nasal Spray] 1 spray NASBOTH BID 20 [History]


Sodium Chloride [Saline Nasal Spray] 1 spray NASBOTH BID PRN 20 [History]


guaiFENesin [Tussin] 200 mg PO Q4H 20 [History]


metFORMIN HCl [Glucophage] 500 mg PO BIDMEALS 20 [History]


predniSONE [Prednisone] 40 mg PO DAILY 20 [History]











Past Medical History


HEENT History: Reports: Cataract, Glaucoma, Impaired Vision


Cardiovascular History: Reports: Blood Clots/VTE/DVT, Heart Failure, Heart 

Murmur, Heart Valve Replacement, High Cholesterol, Hypertension, Other (See 

Below) (POST OP INTRAVASCULAR AORTIC VALVE REPLACEMENT)


Respiratory History: Reports: Asthma


Gastrointestinal History: Reports: Diverticulosis, GERD


Genitourinary History: Reports: Urinary Incontinence, Other (See Below)


Other Genitourinary History: A&P REPAIR,  DYSFUNCTIONAL UTERINE BLEEDING.


OB/GYN History: Reports: Dysfunctional Uterine Bleeding, Pregnancy


Other OB/GYN History:  II PARA II


Musculoskeletal History: Reports: Osteoarthritis


Neurological History: Reports: CVA


Psychiatric History: Reports: None


Endocrine/Metabolic History: Reports: Diabetes, Type II, Hypothyroidism, Obesity

/BMI 30+


Hematologic History: Reports: Anemia


Other Hematologic History: POST SURGICAL FROM [facility]


Immunologic History: Reports: None


Oncologic (Cancer) History: Reports: None


Dermatologic History: Reports: Venous Stasis Dermatitis





- Infectious Disease History


Infectious Disease History: Reports: Chicken Pox, Measles, Mumps





- Past Surgical History


Head Surgeries/Procedures: Reports: None


HEENT Surgical History: Reports: Cataract Surgery


GI Surgical History: Reports: Appendectomy, Cholecystectomy, Colon, Colonoscopy

, Hernia, Abdominal, Hernia Repair/Other


Female  Surgical History: Reports: D&C, Hysterectomy, Oophorectomy, Salpingo-

Oophorectomy





Social & Family History





- Family History


Other HEENT Family History: SEE HX





- Caffeine Use


Caffeine Use: Reports: Coffee


Other Caffeine Use: 2 cups a day


Caffeine Use Comment: 1 cup coffee per day.





ED ROS GENERAL





- Review of Systems


Review Of Systems: Comprehensive ROS is negative, except as noted in HPI.





ED EXAM, GENERAL





- Physical Exam


Exam: See Below


Exam Limited By: Respiratory Distress


General Appearance: Alert, Moderate Distress


Nose: Normal Inspection


Throat/Mouth: Normal Oropharynx, No Airway Compromise


Head: Atraumatic, Normocephalic


Neck: Full Range of Motion


Respiratory/Chest: Respiratory Distress, Decreased Breath Sounds, Rhonchi, 

Wheezing


Cardiovascular: Tachycardia


GI/Abdominal: Non-Tender, No Distention


Extremities: No Pedal Edema


Neurological: Alert, Normal Cognition


Skin Exam: Warm, Dry, Intact





EKG INTERPRETATION


EKG Date: 20


Time: 01:21


Rhythm: Other (tachycardia)


Rate (Beats/Min): 121


QRS: LBBB


Comparison: Other: (LBBB noted on 6/15/18)





Course





- Vital Signs


Last Recorded V/S: 


 Last Vital Signs











Temp  36.1 C   20 07:09


 


Pulse  124 H  20 07:09


 


Resp  29 H  20 07:09


 


BP  208/136 H  20 07:09


 


Pulse Ox  100   20 09:00














- Orders/Labs/Meds


Orders: 


 Active Orders 24 hr











 Category Date Time Status


 


 Admission Status [Patient Status] [ADT] Routine ADT  20 09:25 Active


 


 BIPAP Adult [RT BiPAP/CPAP] [RC] ASDIRECTED Care  20 07:20 Active


 


 EKG Documentation Completion [RC] ASDIRECTED Care  20 07:11 Active


 


 Insert Villar Catheter [Insert Urinary Catheter] [OM.PC] Care  20 07:15 

Ordered





 Q24H   


 


 RT Aerosol Therapy [RC] ASDIRECTED Care  20 07:21 Active


 


 Urinary Catheter Assessment [RC] QSHIFT Care  20 07:14 Active


 


 Ang Chest [CT] Stat Exams  20 08:18 Taken


 


 CXR [Chest 1V Frontal] [CR] Stat Exams  20 07:10 Taken


 


 CULTURE BLOOD [BC] Urgent Lab  20 07:30 Received


 


 CULTURE BLOOD [BC] Urgent Lab  20 07:40 Received


 


 Piperacillin/Tazobactam [Zosyn] 3.375 gm Med  20 09:17 Active





 Sodium Chloride 0.9% [Normal Saline] 50 ml   





 IV .ONCE   


 


 Sodium Chloride 0.9% [Saline Flush] Med  20 07:21 Active





 10 ml FLUSH ASDIRECTED PRN   


 


 Blood Culture x2 Reflex Set [OM.PC] Urgent Oth  20 07:13 Ordered


 


 Saline Lock Insert [OM.PC] Routine Oth  20 07:21 Ordered


 


 EKG 12 Lead [EK] Stat Ther  20 07:10 Ordered








 Medication Orders





Piperacillin Sod/Tazobactam (Sod 3.375 gm/ Sodium Chloride)  50 mls @ 100 mls/

hr IV .ONCE ONE


   Stop: 20 09:46


   Last Admin: 20 09:25  Dose: 100 mls/hr


Sodium Chloride (Saline Flush)  10 ml FLUSH ASDIRECTED PRN


   PRN Reason: Keep Vein Open


   Last Admin: 20 07:54  Dose: 10 ml








Labs: 


 Laboratory Tests











  20 Range/Units





  07:30 07:30 07:30 


 


WBC  17.0 H    (4.5-12.0)  X10-3/uL


 


RBC  3.43    (3.23-5.20)  x10(6)uL


 


Hgb  10.2 L    (11.5-15.5)  g/dL


 


Hct  30.6    (30.0-51.3)  %


 


MCV  89.3    (80-96)  fL


 


MCH  29.8    (27.7-33.6)  pg


 


MCHC  33.4    (32.2-35.4)  g/dL


 


RDW  14.1    (11.5-15.5)  %


 


Plt Count  315    (125-369)  X10(3)uL


 


MPV  8.5    (7.4-10.4)  fL


 


Add Manual Diff  Yes    


 


Neutrophils % (Manual)  79    (46-82)  %


 


Band Neutrophils %  6    (0-6)  %


 


Lymphocytes % (Manual)  9 L    (13-37)  %


 


Monocytes % (Manual)  6    (4-12)  %


 


PT     (8.7-11.1)  


 


INR     (0.89-1.13)  


 


APTT     (24.4-33.2)  SECONDS


 


D-Dimer, Quantitative     (0.0-0.59)  mg/LFEU


 


POC VBG pH     (7.31-7.41)  


 


POC VBG pCO2     (41-51)  mmHG


 


POC VBG HCO3     (23-28)  mmol/L


 


POC VBG Total CO2     (24-29)  mmol/L


 


POC VBG Base Excess     (-2-3)  mmol/L


 


Sodium   133 L   (135-145)  mmol/L


 


Potassium   4.2   (3.5-5.3)  mmol/L


 


Chloride   97 L   (100-110)  mmol/L


 


Carbon Dioxide   27   (21-32)  mmol/L


 


BUN   28 H   (7-18)  mg/dL


 


Creatinine   1.3 H   (0.55-1.02)  mg/dL


 


Est Cr Clr Drug Dosing   TNP   


 


Estimated GFR (MDRD)   39 L   (>60)  


 


BUN/Creatinine Ratio   21.5 H   (9-20)  


 


Glucose   211 H   ()  mg/dL


 


Lactic Acid    1.2  (0.4-2.0)  mmol/L


 


Calcium   9.3   (8.6-10.2)  mg/dL


 


Total Bilirubin   0.4   (0.1-1.3)  mg/dL


 


AST   30 H D   (5-25)  IU/L


 


ALT   15  D   (12-36)  U/L


 


Alkaline Phosphatase   79   ()  IU/L


 


Troponin I     (<0.017-0.056)  ng/mL


 


NT-Pro-B Natriuret Pep     (<=450)  pg/mL


 


Total Protein   8.2 H   (6.0-8.0)  g/dL


 


Albumin   3.9   (3.2-4.6)  g/dL


 


Globulin   4.3   g/dL


 


Albumin/Globulin Ratio   0.9   


 


Urine Color     (YELLOW)  


 


Urine Appearance     (CLEAR)  


 


Urine pH     (5.0-6.5)  


 


Ur Specific Gravity     (1.010-1.025)  


 


Urine Protein     (NEGATIVE)  mg/dL


 


Urine Glucose (UA)     (NORMAL)  mg/dL


 


Urine Ketones     (NEGATIVE)  mg/dL


 


Urine Occult Blood     (NEGATIVE)  


 


Urine Nitrite     (NEGATIVE)  


 


Urine Bilirubin     (NEGATIVE)  


 


Urine Urobilinogen     (NEGATIVE)  mg/dL


 


Ur Leukocyte Esterase     (NEGATIVE)  


 


Urine RBC     (0-5)  


 


Urine WBC     (0-5)  


 


Ur Squamous Epith Cells     (NS,R,O)  


 


Amorphous Sediment     


 


Urine Bacteria     (NS)  














  20 Range/Units





  07:30 07:30 07:40 


 


WBC     (4.5-12.0)  X10-3/uL


 


RBC     (3.23-5.20)  x10(6)uL


 


Hgb     (11.5-15.5)  g/dL


 


Hct     (30.0-51.3)  %


 


MCV     (80-96)  fL


 


MCH     (27.7-33.6)  pg


 


MCHC     (32.2-35.4)  g/dL


 


RDW     (11.5-15.5)  %


 


Plt Count     (125-369)  X10(3)uL


 


MPV     (7.4-10.4)  fL


 


Add Manual Diff     


 


Neutrophils % (Manual)     (46-82)  %


 


Band Neutrophils %     (0-6)  %


 


Lymphocytes % (Manual)     (13-37)  %


 


Monocytes % (Manual)     (4-12)  %


 


PT  31.2 H    (8.7-11.1)  


 


INR  3.26 H    (0.89-1.13)  


 


APTT  31.0    (24.4-33.2)  SECONDS


 


D-Dimer, Quantitative  1.70 H    (0.0-0.59)  mg/LFEU


 


POC VBG pH     (7.31-7.41)  


 


POC VBG pCO2     (41-51)  mmHG


 


POC VBG HCO3     (23-28)  mmol/L


 


POC VBG Total CO2     (24-29)  mmol/L


 


POC VBG Base Excess     (-2-3)  mmol/L


 


Sodium     (135-145)  mmol/L


 


Potassium     (3.5-5.3)  mmol/L


 


Chloride     (100-110)  mmol/L


 


Carbon Dioxide     (21-32)  mmol/L


 


BUN     (7-18)  mg/dL


 


Creatinine     (0.55-1.02)  mg/dL


 


Est Cr Clr Drug Dosing     


 


Estimated GFR (MDRD)     (>60)  


 


BUN/Creatinine Ratio     (9-20)  


 


Glucose     ()  mg/dL


 


Lactic Acid     (0.4-2.0)  mmol/L


 


Calcium     (8.6-10.2)  mg/dL


 


Total Bilirubin     (0.1-1.3)  mg/dL


 


AST     (5-25)  IU/L


 


ALT     (12-36)  U/L


 


Alkaline Phosphatase     ()  IU/L


 


Troponin I   0.046   (<0.017-0.056)  ng/mL


 


NT-Pro-B Natriuret Pep   63218 H*   (<=450)  pg/mL


 


Total Protein     (6.0-8.0)  g/dL


 


Albumin     (3.2-4.6)  g/dL


 


Globulin     g/dL


 


Albumin/Globulin Ratio     


 


Urine Color    Yellow  (YELLOW)  


 


Urine Appearance    Slightly cloudy  (CLEAR)  


 


Urine pH    5.0  (5.0-6.5)  


 


Ur Specific Gravity    1.020  (1.010-1.025)  


 


Urine Protein    500 H  (NEGATIVE)  mg/dL


 


Urine Glucose (UA)    Normal  (NORMAL)  mg/dL


 


Urine Ketones    Negative  (NEGATIVE)  mg/dL


 


Urine Occult Blood    Moderate H  (NEGATIVE)  


 


Urine Nitrite    Negative  (NEGATIVE)  


 


Urine Bilirubin    Negative  (NEGATIVE)  


 


Urine Urobilinogen    Normal  (NEGATIVE)  mg/dL


 


Ur Leukocyte Esterase    Negative  (NEGATIVE)  


 


Urine RBC    5-10 H  (0-5)  


 


Urine WBC    0-5  (0-5)  


 


Ur Squamous Epith Cells    Occasional  (NS,R,O)  


 


Amorphous Sediment    Few  


 


Urine Bacteria    Moderate H  (NS)  














  20 Range/Units





  07:57 


 


WBC   (4.5-12.0)  X10-3/uL


 


RBC   (3.23-5.20)  x10(6)uL


 


Hgb   (11.5-15.5)  g/dL


 


Hct   (30.0-51.3)  %


 


MCV   (80-96)  fL


 


MCH   (27.7-33.6)  pg


 


MCHC   (32.2-35.4)  g/dL


 


RDW   (11.5-15.5)  %


 


Plt Count   (125-369)  X10(3)uL


 


MPV   (7.4-10.4)  fL


 


Add Manual Diff   


 


Neutrophils % (Manual)   (46-82)  %


 


Band Neutrophils %   (0-6)  %


 


Lymphocytes % (Manual)   (13-37)  %


 


Monocytes % (Manual)   (4-12)  %


 


PT   (8.7-11.1)  


 


INR   (0.89-1.13)  


 


APTT   (24.4-33.2)  SECONDS


 


D-Dimer, Quantitative   (0.0-0.59)  mg/LFEU


 


POC VBG pH  7.29 L  (7.31-7.41)  


 


POC VBG pCO2  57.1 H  (41-51)  mmHG


 


POC VBG HCO3  27.4  (23-28)  mmol/L


 


POC VBG Total CO2  29  (24-29)  mmol/L


 


POC VBG Base Excess  1  (-2-3)  mmol/L


 


Sodium   (135-145)  mmol/L


 


Potassium   (3.5-5.3)  mmol/L


 


Chloride   (100-110)  mmol/L


 


Carbon Dioxide   (21-32)  mmol/L


 


BUN   (7-18)  mg/dL


 


Creatinine   (0.55-1.02)  mg/dL


 


Est Cr Clr Drug Dosing   


 


Estimated GFR (MDRD)   (>60)  


 


BUN/Creatinine Ratio   (9-20)  


 


Glucose   ()  mg/dL


 


Lactic Acid   (0.4-2.0)  mmol/L


 


Calcium   (8.6-10.2)  mg/dL


 


Total Bilirubin   (0.1-1.3)  mg/dL


 


AST   (5-25)  IU/L


 


ALT   (12-36)  U/L


 


Alkaline Phosphatase   ()  IU/L


 


Troponin I   (<0.017-0.056)  ng/mL


 


NT-Pro-B Natriuret Pep   (<=450)  pg/mL


 


Total Protein   (6.0-8.0)  g/dL


 


Albumin   (3.2-4.6)  g/dL


 


Globulin   g/dL


 


Albumin/Globulin Ratio   


 


Urine Color   (YELLOW)  


 


Urine Appearance   (CLEAR)  


 


Urine pH   (5.0-6.5)  


 


Ur Specific Gravity   (1.010-1.025)  


 


Urine Protein   (NEGATIVE)  mg/dL


 


Urine Glucose (UA)   (NORMAL)  mg/dL


 


Urine Ketones   (NEGATIVE)  mg/dL


 


Urine Occult Blood   (NEGATIVE)  


 


Urine Nitrite   (NEGATIVE)  


 


Urine Bilirubin   (NEGATIVE)  


 


Urine Urobilinogen   (NEGATIVE)  mg/dL


 


Ur Leukocyte Esterase   (NEGATIVE)  


 


Urine RBC   (0-5)  


 


Urine WBC   (0-5)  


 


Ur Squamous Epith Cells   (NS,R,O)  


 


Amorphous Sediment   


 


Urine Bacteria   (NS)  











Meds: 


Medications











Generic Name Dose Route Start Last Admin





  Trade Name Freq  PRN Reason Stop Dose Admin


 


Piperacillin Sod/Tazobactam  50 mls @ 100 mls/hr  20 09:17  20 09:25





  Sod 3.375 gm/ Sodium Chloride  IV  20 09:46  100 mls/hr





  .ONCE ONE   Administration





     





     





     





     


 


Sodium Chloride  10 ml  20 07:21  20 07:54





  Saline Flush  FLUSH   10 ml





  ASDIRECTED PRN   Administration





  Keep Vein Open   





     





     





     














Discontinued Medications














Generic Name Dose Route Start Last Admin





  Trade Name Reena  PRN Reason Stop Dose Admin


 


Albuterol/Ipratropium  3 ml  20 07:21  20 07:42





  Duoneb 3.0-0.5 Mg/3 Ml  NEB  20 07:22  3 ml





  ONETIME ONE   Administration





     





     





     





     


 


Dexamethasone  10 mg  20 07:21  20 07:43





  Dexamethasone  IVPUSH  20 07:22  10 mg





  ONETIME ONE   Administration





     





     





     





     


 


Furosemide  40 mg  20 07:14  20 07:53





  Lasix  IVPUSH  20 07:15  40 mg





  NOW ONE   Administration





     





     





     





     


 


Iopamidol  100 ml  20 08:27  20 08:33





  Isovue-370 (76%)  IV  20 08:28  66 ml





  .AS DIRECTED ONE   Administration





     





     





     





     


 


Labetalol HCl  10 mg  20 08:00  20 08:00





  Normodyne  IVPUSH  20 08:01  10 mg





  ONETIME ONE   Administration





     





     





  Protocol   





     














- Radiology Interpretation


Free Text/Narrative:: 





CXR: Vascular congestion. Focal airspace opacity at the right base could 

represent asymmetric development of alveolar edema, atelectasis, aspiration or 

pneumonia.





CTA Chest: Mild background pattern of vascular congestion with early 

interstitial edema. Bibasilar airspace disease, right greater than left likely 

pneumonia. No pulmonary emboli.





- Re-Assessments/Exams


Free Text/Narrative Re-Assessment/Exam: 





20 09:31


Placed on Bipap 10/5 x 1 hour, given Lasix 40mg IV, Decadron 10mg IV, and 

Duoneb. Patient significantly improved, no longer short of breath. Sa02 97% on 

2L 02 NC. /66, HR 78 (after Labetolol 10mg IV).


20 09:39


Dr. Jim will admit to [facility].





Departure





- Departure


Time of Disposition: 09:39


Disposition: Admitted As Inpatient 66


Condition: Fair


Clinical Impression: 


 Respiratory distress





Exacerbation of asthma


Qualifiers:


 Asthma severity: moderate Asthma persistence: persistent Qualified Code(s): 

J45.41 - Moderate persistent asthma with (acute) exacerbation





CHF exacerbation


Qualifiers:


 Heart failure type: unspecified Qualified Code(s): I50.9 - Heart failure, 

unspecified





Pneumonia


Qualifiers:


 Pneumonia type: due to unspecified organism Laterality: bilateral Lung location

: lower lobe of lung Qualified Code(s): J18.9 - Pneumonia, unspecified organism








- Discharge Information


*PRESCRIPTION DRUG MONITORING PROGRAM REVIEWED*: No


*COPY OF PRESCRIPTION DRUG MONITORING REPORT IN PATIENT MIKE: Not Applicable


Referrals: 


Strand,Duane, MD [Primary Care Provider] - 


Forms:  ED Department Discharge





Sepsis Event Note





- Evaluation


Sepsis Screening Result: No Definite Risk





- Focused Exam


Vital Signs: 


 Vital Signs











  Temp Pulse Resp BP Pulse Ox Pulse Ox Pulse Ox


 


 20 09:00        100


 


 20 08:24       


 


 20 07:25       100 


 


 20 07:09  36.1 C  124 H  29 H  208/136 H  93 L  














  Pulse Ox


 


 20 09:00 


 


 20 08:24  100


 


 20 07:25 


 


 20 07:09 











Date Exam was Performed: 20


Time Exam was Performed: 09:31





- My Orders


Last 24 Hours: 


My Active Orders





20 07:10


CXR [Chest 1V Frontal] [CR] Stat 


EKG 12 Lead [EK] Stat 





20 07:11


EKG Documentation Completion [RC] ASDIRECTED 





20 07:13


Blood Culture x2 Reflex Set [OM.PC] Urgent 





20 07:14


Urinary Catheter Assessment [RC] QSHIFT 





20 07:15


Insert Villar Catheter [Insert Urinary Catheter] [OM.PC] Q24H 





20 07:20


BIPAP Adult [RT BiPAP/CPAP] [RC] ASDIRECTED 





20 07:21


RT Aerosol Therapy [RC] ASDIRECTED 


Sodium Chloride 0.9% [Saline Flush]   10 ml FLUSH ASDIRECTED PRN 


Saline Lock Insert [OM.PC] Routine 





20 07:30


CULTURE BLOOD [BC] Urgent 





20 07:40


CULTURE BLOOD [BC] Urgent 





20 08:18


Ang Chest [CT] Stat 





20 09:17


Piperacillin/Tazobactam [Zosyn] 3.375 gm   Sodium Chloride 0.9% [Normal Saline] 

50 ml IV .ONCE 





20 09:25


Admission Status [Patient Status] [ADT] Routine 














- Assessment/Plan


Last 24 Hours: 


My Active Orders





20 07:10


CXR [Chest 1V Frontal] [CR] Stat 


EKG 12 Lead [EK] Stat 





20 07:11


EKG Documentation Completion [RC] ASDIRECTED 





20 07:13


Blood Culture x2 Reflex Set [OM.PC] Urgent 





20 07:14


Urinary Catheter Assessment [RC] QSHIFT 





20 07:15


Insert Villar Catheter [Insert Urinary Catheter] [OM.PC] Q24H 





20 07:20


BIPAP Adult [RT BiPAP/CPAP] [RC] ASDIRECTED 





20 07:21


RT Aerosol Therapy [RC] ASDIRECTED 


Sodium Chloride 0.9% [Saline Flush]   10 ml FLUSH ASDIRECTED PRN 


Saline Lock Insert [OM.PC] Routine 





20 07:30


CULTURE BLOOD [BC] Urgent 





20 07:40


CULTURE BLOOD [BC] Urgent 





20 08:18


Ang Chest [CT] Stat 





20 09:17


Piperacillin/Tazobactam [Zosyn] 3.375 gm   Sodium Chloride 0.9% [Normal Saline] 

50 ml IV .ONCE 





20 09:25


Admission Status [Patient Status] [ADT] Routine

## 2020-01-18 RX ADMIN — SODIUM CHLORIDE SCH MG: 9 INJECTION, SOLUTION INTRAVENOUS at 11:34

## 2020-01-18 RX ADMIN — Medication PRN ML: at 05:04

## 2020-01-18 RX ADMIN — Medication PRN ML: at 23:21

## 2020-01-18 RX ADMIN — INSULIN LISPRO SCH UNITS: 100 INJECTION, SOLUTION INTRAVENOUS; SUBCUTANEOUS at 18:02

## 2020-01-18 RX ADMIN — Medication PRN ML: at 11:34

## 2020-01-18 RX ADMIN — Medication PRN ML: at 18:05

## 2020-01-18 RX ADMIN — INSULIN LISPRO SCH UNITS: 100 INJECTION, SOLUTION INTRAVENOUS; SUBCUTANEOUS at 20:04

## 2020-01-18 RX ADMIN — SODIUM CHLORIDE SCH MG: 9 INJECTION, SOLUTION INTRAVENOUS at 05:03

## 2020-01-18 RX ADMIN — SODIUM CHLORIDE SCH MG: 9 INJECTION, SOLUTION INTRAVENOUS at 18:05

## 2020-01-18 RX ADMIN — BUDESONIDE SCH MG: 0.5 SUSPENSION RESPIRATORY (INHALATION) at 09:08

## 2020-01-18 RX ADMIN — SODIUM CHLORIDE SCH MG: 9 INJECTION, SOLUTION INTRAVENOUS at 23:20

## 2020-01-18 RX ADMIN — BUDESONIDE SCH MG: 0.5 SUSPENSION RESPIRATORY (INHALATION) at 20:18

## 2020-01-18 NOTE — PCM.PN
- General Info


Date of Service: 01/18/20


Admission Dx/Problem (Free Text): 


Patient states she feels much better today. On a pain scale that 10 is the 

worst and one is the best she is a 4 today. She was 6 yesterday. She is 

breathing much better but still has a productive cough. She is no fevers, 

chills or shortness of breath. She is off oxygen. She denies any leg swelling.








- Patient Data


Vitals - Most Recent: 


 Last Vital Signs











Temp  99.0 F   01/17/20 23:43


 


Pulse  65   01/17/20 23:43


 


Resp  17   01/17/20 23:43


 


BP  140/64   01/17/20 23:43


 


Pulse Ox  100   01/17/20 23:43











Weight - Most Recent: 168 lb 8 oz


I&O - Last 24 Hours: 


 Intake & Output











 01/17/20 01/18/20 01/18/20





 22:59 06:59 14:59


 


Intake Total 1700 250 450


 


Output Total 1600 250 


 


Balance 100 0 450











Lab Results Last 24 Hours: 


 Laboratory Results - last 24 hr











  01/17/20 01/17/20 01/18/20 Range/Units





  07:40 17:21 06:25 


 


WBC    3.7 L  (4.5-12.0)  X10-3/uL


 


RBC    3.08 L  (3.23-5.20)  x10(6)uL


 


Hgb    9.0 L  (11.5-15.5)  g/dL


 


Hct    27.5 L  (30.0-51.3)  %


 


MCV    89.2  (80-96)  fL


 


MCH    29.2  (27.7-33.6)  pg


 


MCHC    32.8  (32.2-35.4)  g/dL


 


RDW    14.1  (11.5-15.5)  %


 


Plt Count    223  (125-369)  X10(3)uL


 


MPV    8.6  (7.4-10.4)  fL


 


Neut % (Auto)    83.6 H  (46-82)  %


 


Lymph % (Auto)    12.2 L  (13-37)  %


 


Mono % (Auto)    4.2  (4-12)  %


 


Eos % (Auto)    0 L  (1.0-5.0)  %


 


Baso % (Auto)    0  (0-2)  %


 


Neut # (Auto)    3.0  (1.6-8.3)  #


 


Lymph # (Auto)    0.5 L  (0.6-5.0)  #


 


Mono # (Auto)    0.2  (0.0-1.3)  #


 


Eos # (Auto)    0.0  (0.0-0.8)  #


 


Baso # (Auto)    0.0  (0.0-0.2)  #


 


PT     (8.7-11.1)  


 


INR     (0.89-1.13)  


 


Sodium     (135-145)  mmol/L


 


Potassium     (3.5-5.3)  mmol/L


 


Chloride     (100-110)  mmol/L


 


Carbon Dioxide     (21-32)  mmol/L


 


BUN     (7-18)  mg/dL


 


Creatinine     (0.55-1.02)  mg/dL


 


Est Cr Clr Drug Dosing     mL/min


 


Estimated GFR (MDRD)     (>60)  


 


BUN/Creatinine Ratio     (9-20)  


 


Glucose     ()  mg/dL


 


POC Glucose   301 H D   ()  mg/dL


 


Calcium     (8.6-10.2)  mg/dL


 


Urine Color  Yellow    (YELLOW)  


 


Urine Appearance  Slightly cloudy    (CLEAR)  


 


Urine pH  5.0    (5.0-6.5)  


 


Ur Specific Gravity  1.020    (1.010-1.025)  


 


Urine Protein  500 H    (NEGATIVE)  mg/dL


 


Urine Glucose (UA)  Normal    (NORMAL)  mg/dL


 


Urine Ketones  Negative    (NEGATIVE)  mg/dL


 


Urine Occult Blood  Moderate H    (NEGATIVE)  


 


Urine Nitrite  Negative    (NEGATIVE)  


 


Urine Bilirubin  Negative    (NEGATIVE)  


 


Urine Urobilinogen  Normal    (NEGATIVE)  mg/dL


 


Ur Leukocyte Esterase  Negative    (NEGATIVE)  


 


Urine RBC  5-10 H    (0-5)  


 


Urine WBC  0-5    (0-5)  


 


Ur Squamous Epith Cells  Occasional    (NS,R,O)  


 


Amorphous Sediment  Few    


 


Urine Bacteria  Moderate H    (NS)  














  01/18/20 01/18/20 01/18/20 Range/Units





  06:25 06:25 06:33 


 


WBC     (4.5-12.0)  X10-3/uL


 


RBC     (3.23-5.20)  x10(6)uL


 


Hgb     (11.5-15.5)  g/dL


 


Hct     (30.0-51.3)  %


 


MCV     (80-96)  fL


 


MCH     (27.7-33.6)  pg


 


MCHC     (32.2-35.4)  g/dL


 


RDW     (11.5-15.5)  %


 


Plt Count     (125-369)  X10(3)uL


 


MPV     (7.4-10.4)  fL


 


Neut % (Auto)     (46-82)  %


 


Lymph % (Auto)     (13-37)  %


 


Mono % (Auto)     (4-12)  %


 


Eos % (Auto)     (1.0-5.0)  %


 


Baso % (Auto)     (0-2)  %


 


Neut # (Auto)     (1.6-8.3)  #


 


Lymph # (Auto)     (0.6-5.0)  #


 


Mono # (Auto)     (0.0-1.3)  #


 


Eos # (Auto)     (0.0-0.8)  #


 


Baso # (Auto)     (0.0-0.2)  #


 


PT  36.5 H*    (8.7-11.1)  


 


INR  3.81 H    (0.89-1.13)  


 


Sodium   133 L   (135-145)  mmol/L


 


Potassium   4.2   (3.5-5.3)  mmol/L


 


Chloride   96 L   (100-110)  mmol/L


 


Carbon Dioxide   25   (21-32)  mmol/L


 


BUN   38 H D   (7-18)  mg/dL


 


Creatinine   1.6 H   (0.55-1.02)  mg/dL


 


Est Cr Clr Drug Dosing   19.40   mL/min


 


Estimated GFR (MDRD)   31 L   (>60)  


 


BUN/Creatinine Ratio   23.8 H   (9-20)  


 


Glucose   224 H   ()  mg/dL


 


POC Glucose    235 H  ()  mg/dL


 


Calcium   8.9   (8.6-10.2)  mg/dL


 


Urine Color     (YELLOW)  


 


Urine Appearance     (CLEAR)  


 


Urine pH     (5.0-6.5)  


 


Ur Specific Gravity     (1.010-1.025)  


 


Urine Protein     (NEGATIVE)  mg/dL


 


Urine Glucose (UA)     (NORMAL)  mg/dL


 


Urine Ketones     (NEGATIVE)  mg/dL


 


Urine Occult Blood     (NEGATIVE)  


 


Urine Nitrite     (NEGATIVE)  


 


Urine Bilirubin     (NEGATIVE)  


 


Urine Urobilinogen     (NEGATIVE)  mg/dL


 


Ur Leukocyte Esterase     (NEGATIVE)  


 


Urine RBC     (0-5)  


 


Urine WBC     (0-5)  


 


Ur Squamous Epith Cells     (NS,R,O)  


 


Amorphous Sediment     


 


Urine Bacteria     (NS)  











Alonso Results Last 24 Hours: 


 Microbiology











 01/17/20 07:30 Aerobic Blood Culture - Preliminary





 Blood - Venous    NO GROWTH AFTER 1 DAY





 Anaerobic Blood Culture - Preliminary





    NO GROWTH AFTER 1 DAY


 


 01/17/20 07:40 Aerobic Blood Culture - Preliminary





 Blood - Venous - Lab Draw    NO GROWTH AFTER 1 DAY





 Anaerobic Blood Culture - Preliminary





    NO GROWTH AFTER 1 DAY


 


 01/17/20 07:26 Influenza Type A Antigen Screen - Final





 Nasopharyngeal Swab    NEGATIVE INFLUENZA A VIRUS AG





    REFERENCE RANGE: NEGATIVE





 Influenza Type B Antigen Screen - Final





    NEGATIVE INFLUENZA B VIRUS AG





    REFERENCE RANGE: NEGATIVE











Med Orders - Current: 


 Current Medications





Acetaminophen (Tylenol)  650 mg PO Q4H PRN


   PRN Reason: Pain/Fever


Albuterol (Proventil Neb Soln)  2.5 mg NEB Q2H PRN


   PRN Reason: Shortness Of Breath/wheezing


Albuterol/Ipratropium (Duoneb 3.0-0.5 Mg/3 Ml)  3 ml INH QIDRT [facility]


   Last Admin: 01/18/20 06:05 Dose:  3 ml


Benzonatate (Tessalon Perles)  100 mg PO TID PRN


   PRN Reason: Cough


Budesonide (Pulmicort)  0.5 mg NEB BID [facility]


   Last Admin: 01/17/20 20:15 Dose:  0.5 mg


Carvedilol (Coreg)  3.125 mg PO BIDMEALS [facility]


   Last Admin: 01/17/20 17:30 Dose:  3.125 mg


Docusate Sodium (Colace)  100 mg PO Q48H [facility]


   Last Admin: 01/17/20 13:07 Dose:  100 mg


Furosemide (Lasix)  40 mg PO DAILY [facility]


Levothyroxine Sodium (Synthroid)  88 mcg PO SUTUWETHSA [facility]


   Last Admin: 01/18/20 05:04 Dose:  88 mcg


Levothyroxine Sodium (Synthroid)  176 mcg PO MoFr@0600 [facility]


   Last Admin: 01/17/20 16:33 Dose:  176 mcg


Lisinopril (Prinivil)  5 mg PO DAILY [facility]


   Last Admin: 01/17/20 13:07 Dose:  5 mg


Magnesium Oxide (Magnesium Oxide)  400 mg PO DAILY [facility]


   Last Admin: 01/17/20 13:08 Dose:  400 mg


Metformin HCl (Glucophage)  500 mg PO BIDMEALS [facility]


Methylprednisolone Sodium Succinate (Solu-Medrol)  60 mg IVPUSH Q6H [facility]


   Last Admin: 01/18/20 05:03 Dose:  60 mg


Pantoprazole Sodium (Protonix***)  40 mg PO DAILY [facility]


   Last Admin: 01/17/20 13:08 Dose:  40 mg


Prednisone (Prednisone)  40 mg PO WITHBREAKFAST [facility]


Rosuvastatin Calcium (Crestor)  5 mg PO DAILY [facility]


   Last Admin: 01/17/20 13:07 Dose:  5 mg


Sodium Chloride (Saline Flush)  10 ml FLUSH ASDIRECTED PRN


   PRN Reason: Keep Vein Open


   Last Admin: 01/18/20 05:04 Dose:  10 ml





Discontinued Medications





Albuterol/Ipratropium (Duoneb 3.0-0.5 Mg/3 Ml)  3 ml NEB ONETIME ONE


   Stop: 01/17/20 07:22


   Last Admin: 01/17/20 07:42 Dose:  3 ml


Dexamethasone (Dexamethasone)  10 mg IVPUSH ONETIME ONE


   Stop: 01/17/20 07:22


   Last Admin: 01/17/20 07:43 Dose:  10 mg


Furosemide (Lasix)  40 mg IVPUSH NOW ONE


   Stop: 01/17/20 07:15


   Last Admin: 01/17/20 07:53 Dose:  40 mg


Furosemide (Lasix)  40 mg IVPUSH DAILY [facility]


Piperacillin Sod/Tazobactam (Sod 3.375 gm/ Sodium Chloride)  50 mls @ 100 mls/

hr IV .ONCE ONE


   Stop: 01/17/20 09:46


   Last Admin: 01/17/20 09:25 Dose:  100 mls/hr


Piperacillin Sod/Tazobactam (Sod 3.375 gm/ Sodium Chloride)  50 mls @ 100 mls/

hr IV Q6H [facility]


   Last Admin: 01/18/20 04:36 Dose:  100 mls/hr


Iopamidol (Isovue-370 (76%))  100 ml IV .AS DIRECTED ONE


   Stop: 01/17/20 08:28


   Last Admin: 01/17/20 08:33 Dose:  66 ml


Labetalol HCl (Normodyne)  10 mg IVPUSH ONETIME ONE; Protocol


   Stop: 01/17/20 08:01


   Last Admin: 01/17/20 08:00 Dose:  10 mg


Warfarin Sodium (Coumadin Sliding Scale)  1 each PO ASDIRECTED [facility]











- Exam


General: Alert, Oriented


Lungs: Normal Respiratory Effort, Wheezing (Expiratory but good air movement 

bilaterally)


Cardiovascular: Regular Rate, Regular Rhythm, No Murmurs


Extremities: No Pedal Edema





Sepsis Event Note





- Evaluation


Sepsis Screening Result: No Definite Risk





- Focused Exam


Vital Signs: 


 Vital Signs











  Temp Pulse Resp BP Pulse Ox


 


 01/17/20 23:43  99.0 F  65  17  140/64  100


 


 01/17/20 21:30      98











Date Exam was Performed: 01/18/20


Time Exam was Performed: 08:39





- Problem List & Annotations


(1) Acute exacerbation of congestive heart failure


SNOMED Code(s): 559518306, 56077646724666


   Code(s): I50.9 - HEART FAILURE, UNSPECIFIED   Status: Acute   Current Visit: 

Yes   


Qualifiers: 


   Heart failure type: unspecified   Qualified Code(s): I50.9 - Heart failure, 

unspecified   





(2) Exacerbation of asthma


SNOMED Code(s): 980194116


   Code(s): J45.901 - UNSPECIFIED ASTHMA WITH (ACUTE) EXACERBATION   Status: 

Acute   Current Visit: Yes   


Qualifiers: 


   Asthma severity: moderate   Asthma persistence: persistent   Qualified Code(s

): J45.41 - Moderate persistent asthma with (acute) exacerbation   





(3) Pneumonia


SNOMED Code(s): 607749655


   Code(s): J18.9 - PNEUMONIA, UNSPECIFIED ORGANISM   Status: Acute   Current 

Visit: Yes   


Qualifiers: 


   Pneumonia type: due to unspecified organism   Laterality: bilateral   Lung 

location: lower lobe of lung   Qualified Code(s): J18.9 - Pneumonia, 

unspecified organism   





(4) Respiratory distress


SNOMED Code(s): 981183280


   Code(s): R06.03 - ACUTE RESPIRATORY DISTRESS   Status: Acute   Current Visit

: Yes   





(5) CKD (chronic kidney disease), stage III


SNOMED Code(s): 759270303


   Code(s): N18.3 - CHRONIC KIDNEY DISEASE, STAGE 3 (MODERATE)   Status: Acute 

  Current Visit: No   Annotation/Comment:: Baseline creatinine is 1.4 range 

which is where she was here. Continue to monitor.   





(6) Palliative care status


SNOMED Code(s): 077567649


   Code(s): Z51.5 - ENCOUNTER FOR PALLIATIVE CARE   Status: Acute   Current 

Visit: No   





(7) Hyponatremia


SNOMED Code(s): 25588644


   Code(s): E87.1 - HYPO-OSMOLALITY AND HYPONATREMIA   Status: Acute   Current 

Visit: Yes   





- Problem List Review


Problem List Initiated/Reviewed/Updated: Yes





- My Orders


Last 24 Hours: 


My Active Orders





01/17/20 17:25


Accu Check [Blood Glucose Check, Bedside] [RC] BIDAC 





01/17/20 Dinner


Low Sodium [Sodium Restricted Diet] [DIET] 





01/18/20 08:34


DC Villar Catheter [Urinary Catheter Removal] [RC] Per Unit Routine 





01/18/20 08:35


Ambulate [RC] PER UNIT ROUTINE 


Discontinue Telemetry Monitoring [Cardiac Monitoring Discontinue] [RC] Click to 

Edit 





01/18/20 08:37


Accu Check [Blood Glucose Check, Bedside] [RC] BIDMEALS 





01/18/20 09:00


Furosemide [Lasix]   40 mg PO DAILY 





01/19/20 06:00


INR,PT,PROTHROMBIN TIME [COAG] DAILY 





01/19/20 08:00


predniSONE   40 mg PO WITHBREAKFAST 





01/20/20 06:00


INR,PT,PROTHROMBIN TIME [COAG] DAILY 





01/21/20 06:00


INR,PT,PROTHROMBIN TIME [COAG] DAILY 





01/22/20 06:00


INR,PT,PROTHROMBIN TIME [COAG] DAILY 





01/23/20 06:00


INR,PT,PROTHROMBIN TIME [COAG] DAILY 














- Plan


Plan:: 


1. Hold Coumadin today and check INR in the a.m.


2. DC telemetry and Villar catheter


3. DC IV Lasix and go to his 40 mg of by mouth Lasix


4. UA in 2 days


5. Accu-Cheks twice a day. Patient currently not on diabetic diet will see how 

her blood sugars are before we switch her from low-sodium.


6. Check BMP in the a.m.

## 2020-01-19 VITALS — DIASTOLIC BLOOD PRESSURE: 84 MMHG | SYSTOLIC BLOOD PRESSURE: 178 MMHG

## 2020-01-19 VITALS — HEART RATE: 84 BPM

## 2020-01-19 RX ADMIN — SODIUM CHLORIDE SCH MG: 9 INJECTION, SOLUTION INTRAVENOUS at 05:17

## 2020-01-19 RX ADMIN — INSULIN LISPRO SCH UNITS: 100 INJECTION, SOLUTION INTRAVENOUS; SUBCUTANEOUS at 08:15

## 2020-01-19 RX ADMIN — SODIUM CHLORIDE SCH: 9 INJECTION, SOLUTION INTRAVENOUS at 14:52

## 2020-01-19 RX ADMIN — Medication PRN ML: at 05:18

## 2020-01-19 RX ADMIN — INSULIN LISPRO SCH: 100 INJECTION, SOLUTION INTRAVENOUS; SUBCUTANEOUS at 14:52

## 2020-01-19 RX ADMIN — BUDESONIDE SCH MG: 0.5 SUSPENSION RESPIRATORY (INHALATION) at 08:40

## 2020-01-19 NOTE — PCM.DCSUM1
**Discharge Summary





- Hospital Course


Free Text/Narrative:: 


Hospital course-patient was on BiPAP in the emergency room because she was a 

DNR they did not intubate. By the time she had to the floor she was on O2. CT 

scan of the chest showed no PE but CHF, pneumonia. She has history of asthma 

which is probably exacerbated. She is placed on steroids Solu-Medrol initially 

and then 40 mg of prednisone a day, Zosyn and duoneb treatments. The patient 

was on oxygen within 2 days was completely off the oxygen. Her lungs were for 

very wheezy to be in very clear. Her cranial is a little elevated and that 

stayed that way. She is a little anemic. Patient is unable to walk but that's 

normal for her. We did have a catheter and displacement admitting doctor 

because of the Lasix was initially given IV and she refuses taken out in the we 

attempted day 2. She is given IV Lasix initially then I just increase her Lasix 

from 20to40 mg a day and she did well in the early morning before she was 

discharged she says she was very weak and couldn't get up. She try to get up by 

herself which she normally doesn't do at home. She has assist. She felt a 

little confused and was evaluated by the ER doctor had a CT of the head showed 

old infarct in a EKG that showed left bundle branch block. By the time I saw 

her she looked a little anxious but was doing well. I believe this is more 

likely anxiety from confusion.





Brief History: This is a very pleasant 85-year-old female patient states she 

got acutely short of breath this morning and was brought over here by 

ambulance. She said last weekend which is about a week ago she started getting 

a cold with nasal congestion and a productive cough. The clinic call over 

doxycycline and prednisone. Over the week she seemed to get worse and he last 

night started having shortness of breath and got worse today and was seen in 

the ER. In the ER they put her on BiPAP for a while gave her some Lasix and she 

got better. They did a CT scan that showed pulmonary vascular congestion and 

pneumonia. She did have a flu shot this year. She denies fevers, chills, runny 

nose or congestion at this time per she has no ear pain, sore throat. She does 

have wheezing and little shortness of breath. She is much better and oxygen. 

She says she's a 6 out of 10 from she normally feels.


Diagnosis: Stroke: No





- Discharge Data


Discharge Date: 01/19/20


Discharge Disposition: DC/[facility] to [facility]


Condition: Good





- Referral to Home Health


Primary Care Physician: 


Duane Strand, MD








- Discharge Diagnosis/Problem(s)


(1) Acute exacerbation of congestive heart failure


SNOMED Code(s): 707921512, 51265389329452


   ICD Code: I50.9 - HEART FAILURE, UNSPECIFIED   Status: Acute   Current Visit

: Yes   


Qualifiers: 


   Heart failure type: unspecified   Qualified Code(s): I50.9 - Heart failure, 

unspecified   





(2) Exacerbation of asthma


SNOMED Code(s): 704797929


   ICD Code: J45.901 - UNSPECIFIED ASTHMA WITH (ACUTE) EXACERBATION   Status: 

Acute   Current Visit: Yes   


Qualifiers: 


   Asthma severity: moderate   Asthma persistence: persistent   Qualified Code(s

): J45.41 - Moderate persistent asthma with (acute) exacerbation   





(3) Pneumonia


SNOMED Code(s): 133747726


   ICD Code: J18.9 - PNEUMONIA, UNSPECIFIED ORGANISM   Status: Acute   Current 

Visit: Yes   


Qualifiers: 


   Pneumonia type: due to unspecified organism   Laterality: bilateral   Lung 

location: lower lobe of lung   Qualified Code(s): J18.9 - Pneumonia, 

unspecified organism   





(4) Respiratory distress


SNOMED Code(s): 020765318


   ICD Code: R06.03 - ACUTE RESPIRATORY DISTRESS   Status: Acute   Current Visit

: Yes   





(5) CKD (chronic kidney disease), stage III


SNOMED Code(s): 195581865


   ICD Code: N18.3 - CHRONIC KIDNEY DISEASE, STAGE 3 (MODERATE)   Status: Acute

   Current Visit: No   Problem Details: Baseline creatinine is 1.4 range which 

is where she was here. Continue to monitor.   





(6) Palliative care status


SNOMED Code(s): 065412194


   ICD Code: Z51.5 - ENCOUNTER FOR PALLIATIVE CARE   Status: Acute   Current 

Visit: No   





(7) Hyponatremia


SNOMED Code(s): 00204454


   ICD Code: E87.1 - HYPO-OSMOLALITY AND HYPONATREMIA   Status: Acute   Current 

Visit: Yes   





- Patient Instructions


Diet: Diabetic Diet


Activity, Other: Wheelchair


Driving: Do Not Drive


Showering/Bathing: May Shower





- Discharge Plan


*PRESCRIPTION DRUG MONITORING PROGRAM REVIEWED*: No


*COPY OF PRESCRIPTION DRUG MONITORING REPORT IN PATIENT MIKE: Not Applicable


Prescriptions/Med Rec: 


Albuterol/Ipratropium [DuoNeb 3.0-0.5 MG/3 ML] 3 ml INH QIDRT PRN #120 neb


 PRN Reason: Dyspnea


Amoxicillin/Clavulanate K [Augmentin 500-125 MG] 1 tab PO TID #30 tab


Furosemide [Lasix] 40 mg PO DAILY #30 tablet


Home Medications: 


 Home Meds





Triamcinolone Acetonide [Kenalog 0.1% Crm] 1 applic TOP BID PRN 10/30/13 [

History]


Warfarin [Coumadin] 2.5 mg PO SUTUWETHSA 10/23/14 [History]


Levothyroxine [Synthroid] 88 mcg PO SUTUWETHSA 05/15/17 [History]


Ferrous Gluconate 324 mg PO DAILY@1200 01/09/18 [History]


Budesonide [Pulmicort] 0.5 mg IH BID 06/15/18 [History]


Warfarin [Coumadin] 5 mg PO MOFR 06/15/18 [History]


carvediloL [Carvedilol] 3.125 mg PO BIDMEALS 06/15/18 [History]


Acetaminophen [Tylenol] 650 mg PO BEDTIME 01/17/20 [History]


Acetaminophen [Tylenol] 650 mg PO Q4H PRN 01/17/20 [History]


Alum Hydrox/Mag Hydrox/Simeth [Maalox Advanced] 10 ml PO ASDIRECTED PRN 01/17/ 20 [History]


Benzonatate 100 mg PO TID PRN 01/17/20 [History]


Calcium Carbonate/Vitamin D3 [Calcium 600-Vit D3 400 Tablet] 1 tab PO BID 01/17/ 20 [History]


Carboxymethylcellulose Sodium [Artificial Tears] 1 drop EYEBOTH BID PRN 01/17/ 20 [History]


Docusate Sodium 100 mg PO Q48H 01/17/20 [History]


Ketotifen Fumarate [Zaditor] 1 drop EYEBOTH BID PRN 01/17/20 [History]


Levothyroxine [Synthroid] 176 mcg PO MOFR 01/17/20 [History]


Lisinopril [Zestril] 5 mg PO DAILY 01/17/20 [History]


Magnesium Oxide [Magnesium] 400 mg PO DAILY 01/17/20 [History]


Multivit-Min/FA/Lycopene/Lut [Senior Tabs] 1 tab PO DAILY 01/17/20 [History]


Nystatin 1 applic TOP BID PRN 01/17/20 [History]


Omeprazole 20 mg PO DAILY 01/17/20 [History]


Rosuvastatin [Crestor] 5 mg PO DAILY 01/17/20 [History]


Sodium Chloride [Saline Nasal Spray] 1 spray NASBOTH BID 01/17/20 [History]


Sodium Chloride [Saline Nasal Spray] 1 spray NASBOTH BID PRN 01/17/20 [History]


guaiFENesin [Tussin] 200 mg PO Q4H 01/17/20 [History]


metFORMIN HCl [Glucophage] 500 mg PO BIDMEALS 01/17/20 [History]


Albuterol/Ipratropium [DuoNeb 3.0-0.5 MG/3 ML] 3 ml INH QIDRT PRN #120 neb 01/19 /20 [Rx]


Amoxicillin/Clavulanate K [Augmentin 500-125 MG] 1 tab PO TID #30 tab 01/19/20 [

Rx]


Furosemide [Lasix] 40 mg PO DAILY #30 tablet 01/19/20 [Rx]


predniSONE [Prednisone] 40 mg PO DAILY #5 01/19/20 [Rx]








Patient Handouts:  Type 2 Diabetes Mellitus, Diagnosis, Adult, Fall Prevention 

in Hospitals, Adult, Venous Thromboembolism Prevention, Community-Acquired 

Pneumonia, Adult, Easy-to-Read


Forms:  ED Department Discharge


Referrals: 


Strand,Duane, MD [Primary Care Provider] - 





- Discharge Summary/Plan Comment


DC Time >30 min.: No





- Patient Data


Vitals - Most Recent: 


 Last Vital Signs











Temp  97.8 F   01/19/20 00:00


 


Pulse  78   01/19/20 08:40


 


Resp  18   01/19/20 00:00


 


BP  178/84 H  01/19/20 08:17


 


Pulse Ox  98   01/19/20 00:00











Weight - Most Recent: 168 lb 14.4 oz


I&O - Last 24 hours: 


 Intake & Output











 01/18/20 01/19/20 01/19/20





 22:59 06:59 14:59


 


Intake Total 700 150 


 


Output Total 550 275 


 


Balance 150 -125 











Lab Results - Last 24 hrs: 


 Laboratory Results - last 24 hr











  01/18/20 01/18/20 01/19/20 Range/Units





  17:10 20:04 05:29 


 


PT     (8.7-11.1)  


 


INR     (0.89-1.13)  


 


Sodium     (135-145)  mmol/L


 


Potassium     (3.5-5.3)  mmol/L


 


Chloride     (100-110)  mmol/L


 


Carbon Dioxide     (21-32)  mmol/L


 


BUN     (7-18)  mg/dL


 


Creatinine     (0.55-1.02)  mg/dL


 


Est Cr Clr Drug Dosing     mL/min


 


Estimated GFR (MDRD)     (>60)  


 


BUN/Creatinine Ratio     (9-20)  


 


Glucose     ()  mg/dL


 


POC Glucose  367 H D  301 H  224 H  ()  mg/dL


 


Calcium     (8.6-10.2)  mg/dL














  01/19/20 01/19/20 Range/Units





  07:10 07:10 


 


PT  32.0 H   (8.7-11.1)  


 


INR  3.34 H   (0.89-1.13)  


 


Sodium   134 L  (135-145)  mmol/L


 


Potassium   4.5  (3.5-5.3)  mmol/L


 


Chloride   97 L  (100-110)  mmol/L


 


Carbon Dioxide   26  (21-32)  mmol/L


 


BUN   45 H  (7-18)  mg/dL


 


Creatinine   1.7 H  (0.55-1.02)  mg/dL


 


Est Cr Clr Drug Dosing   18.26  mL/min


 


Estimated GFR (MDRD)   29 L  (>60)  


 


BUN/Creatinine Ratio   26.5 H  (9-20)  


 


Glucose   236 H  ()  mg/dL


 


POC Glucose    ()  mg/dL


 


Calcium   9.2  (8.6-10.2)  mg/dL











[facility]MAN Results - Last 24 hrs: 


 Microbiology











 01/17/20 07:40 Aerobic Blood Culture - Preliminary





 Blood - Venous - Lab Draw    NO GROWTH AFTER 2 DAYS





 Anaerobic Blood Culture - Preliminary





    NO GROWTH AFTER 2 DAYS


 


 01/17/20 07:30 Aerobic Blood Culture - Preliminary





 Blood - Venous    NO GROWTH AFTER 2 DAYS





 Anaerobic Blood Culture - Preliminary





    NO GROWTH AFTER 2 DAYS











Med Orders - Current: 


 Current Medications





Acetaminophen (Tylenol)  650 mg PO Q4H PRN


   PRN Reason: Pain/Fever


Albuterol (Proventil Neb Soln)  2.5 mg NEB Q2H PRN


   PRN Reason: Shortness Of Breath/wheezing


Albuterol/Ipratropium (Duoneb 3.0-0.5 Mg/3 Ml)  3 ml INH QIDRT [facility]


   Last Admin: 01/18/20 20:00 Dose:  3 ml


Benzonatate (Tessalon Perles)  100 mg PO TID PRN


   PRN Reason: Cough


Budesonide (Pulmicort)  0.5 mg NEB BID [facility]


   Last Admin: 01/19/20 08:40 Dose:  0.5 mg


Carvedilol (Coreg)  3.125 mg PO BIDMEALS [facility]


   Last Admin: 01/19/20 08:16 Dose:  3.125 mg


Docusate Sodium (Colace)  100 mg PO Q48H [facility]


   Last Admin: 01/17/20 13:07 Dose:  100 mg


Furosemide (Lasix)  40 mg PO DAILY [facility]


   Last Admin: 01/19/20 08:23 Dose:  40 mg


Insulin Human Lispro (Humalog)  0 unit SUBCUT QIDACANDBED [facility]; Protocol


   Last Admin: 01/19/20 08:15 Dose:  2 units


Levothyroxine Sodium (Synthroid)  88 mcg PO SUTUWETHSA [facility]


   Last Admin: 01/19/20 05:17 Dose:  88 mcg


Levothyroxine Sodium (Synthroid)  176 mcg PO MoFr@0600 [facility]


   Last Admin: 01/17/20 16:33 Dose:  176 mcg


Lisinopril (Prinivil)  5 mg PO DAILY [facility]


   Last Admin: 01/19/20 08:17 Dose:  5 mg


Magnesium Oxide (Magnesium Oxide)  400 mg PO DAILY [facility]


   Last Admin: 01/19/20 08:18 Dose:  400 mg


Metformin HCl (Glucophage)  500 mg PO BIDMEALS [facility]


Methylprednisolone Sodium Succinate (Solu-Medrol)  60 mg IVPUSH Q6H [facility]


   Last Admin: 01/19/20 05:17 Dose:  60 mg


Pantoprazole Sodium (Protonix***)  40 mg PO DAILY [facility]


   Last Admin: 01/19/20 08:23 Dose:  40 mg


Prednisone (Prednisone)  40 mg PO WITHBREAKFAST [facility]


   Last Admin: 01/19/20 08:38 Dose:  40 mg


Rosuvastatin Calcium (Crestor)  5 mg PO DAILY [facility]


   Last Admin: 01/19/20 08:19 Dose:  5 mg


Sodium Chloride (Saline Flush)  10 ml FLUSH ASDIRECTED PRN


   PRN Reason: Keep Vein Open


   Last Admin: 01/19/20 05:18 Dose:  10 ml





Discontinued Medications





Albuterol/Ipratropium (Duoneb 3.0-0.5 Mg/3 Ml)  3 ml NEB ONETIME ONE


   Stop: 01/17/20 07:22


   Last Admin: 01/17/20 07:42 Dose:  3 ml


Dexamethasone (Dexamethasone)  10 mg IVPUSH ONETIME ONE


   Stop: 01/17/20 07:22


   Last Admin: 01/17/20 07:43 Dose:  10 mg


Furosemide (Lasix)  40 mg IVPUSH NOW ONE


   Stop: 01/17/20 07:15


   Last Admin: 01/17/20 07:53 Dose:  40 mg


Furosemide (Lasix)  40 mg IVPUSH DAILY [facility]


Piperacillin Sod/Tazobactam (Sod 3.375 gm/ Sodium Chloride)  50 mls @ 100 mls/

hr IV .ONCE ONE


   Stop: 01/17/20 09:46


   Last Admin: 01/17/20 09:25 Dose:  100 mls/hr


Piperacillin Sod/Tazobactam (Sod 3.375 gm/ Sodium Chloride)  50 mls @ 100 mls/

hr IV Q6H [facility]


   Last Admin: 01/18/20 04:36 Dose:  100 mls/hr


Iopamidol (Isovue-370 (76%))  100 ml IV .AS DIRECTED ONE


   Stop: 01/17/20 08:28


   Last Admin: 01/17/20 08:33 Dose:  66 ml


Labetalol HCl (Normodyne)  10 mg IVPUSH ONETIME ONE; Protocol


   Stop: 01/17/20 08:01


   Last Admin: 01/17/20 08:00 Dose:  10 mg


Warfarin Sodium (Coumadin Sliding Scale)  1 each PO ASDIRECTED [facility]











*Q Meaningful Use (DIS)





- VTE *Q


VTE Pharmacological Contraindications *Q: High INR Value

## 2020-01-19 NOTE — PCM.PN
- General Info


Date of Service: 01/19/20


Admission Dx/Problem (Free Text): 


The patient states today that she doesn't feel quite right. She says she tried 

to get up and she was week. She did not sleep that well last night. She does 

not get up on her own at home. She doesn't think she is confused today but felt 

a little not right today. She did have a CT scan after being evaluated with the 

ER doctor. Showed old infarct but nothing new. She says her lungs are doing 

well she has no shortness of breath, chest pain, fevers, chills with or leg 

swelling. Just a little bit of cough.








- Patient Data


Vitals - Most Recent: 


 Last Vital Signs











Temp  97.8 F   01/19/20 00:00


 


Pulse  78   01/19/20 08:40


 


Resp  18   01/19/20 00:00


 


BP  178/84 H  01/19/20 08:17


 


Pulse Ox  98   01/19/20 00:00











Weight - Most Recent: 168 lb 14.4 oz


I&O - Last 24 Hours: 


 Intake & Output











 01/18/20 01/19/20 01/19/20





 22:59 06:59 14:59


 


Intake Total 700 150 


 


Output Total 550 275 


 


Balance 150 -125 











Lab Results Last 24 Hours: 


 Laboratory Results - last 24 hr











  01/18/20 01/18/20 01/19/20 Range/Units





  17:10 20:04 05:29 


 


PT     (8.7-11.1)  


 


INR     (0.89-1.13)  


 


Sodium     (135-145)  mmol/L


 


Potassium     (3.5-5.3)  mmol/L


 


Chloride     (100-110)  mmol/L


 


Carbon Dioxide     (21-32)  mmol/L


 


BUN     (7-18)  mg/dL


 


Creatinine     (0.55-1.02)  mg/dL


 


Est Cr Clr Drug Dosing     mL/min


 


Estimated GFR (MDRD)     (>60)  


 


BUN/Creatinine Ratio     (9-20)  


 


Glucose     ()  mg/dL


 


POC Glucose  367 H D  301 H  224 H  ()  mg/dL


 


Calcium     (8.6-10.2)  mg/dL














  01/19/20 01/19/20 Range/Units





  07:10 07:10 


 


PT  32.0 H   (8.7-11.1)  


 


INR  3.34 H   (0.89-1.13)  


 


Sodium   134 L  (135-145)  mmol/L


 


Potassium   4.5  (3.5-5.3)  mmol/L


 


Chloride   97 L  (100-110)  mmol/L


 


Carbon Dioxide   26  (21-32)  mmol/L


 


BUN   45 H  (7-18)  mg/dL


 


Creatinine   1.7 H  (0.55-1.02)  mg/dL


 


Est Cr Clr Drug Dosing   18.26  mL/min


 


Estimated GFR (MDRD)   29 L  (>60)  


 


BUN/Creatinine Ratio   26.5 H  (9-20)  


 


Glucose   236 H  ()  mg/dL


 


POC Glucose    ()  mg/dL


 


Calcium   9.2  (8.6-10.2)  mg/dL











[facility] Results Last 24 Hours: 


 Microbiology











 01/17/20 07:40 Aerobic Blood Culture - Preliminary





 Blood - Venous - Lab Draw    NO GROWTH AFTER 2 DAYS





 Anaerobic Blood Culture - Preliminary





    NO GROWTH AFTER 2 DAYS


 


 01/17/20 07:30 Aerobic Blood Culture - Preliminary





 Blood - Venous    NO GROWTH AFTER 2 DAYS





 Anaerobic Blood Culture - Preliminary





    NO GROWTH AFTER 2 DAYS











Med Orders - Current: 


 Current Medications





Acetaminophen (Tylenol)  650 mg PO Q4H PRN


   PRN Reason: Pain/Fever


Albuterol (Proventil Neb Soln)  2.5 mg NEB Q2H PRN


   PRN Reason: Shortness Of Breath/wheezing


Albuterol/Ipratropium (Duoneb 3.0-0.5 Mg/3 Ml)  3 ml INH QIDRT [facility]


   Last Admin: 01/18/20 20:00 Dose:  3 ml


Benzonatate (Tessalon Perles)  100 mg PO TID PRN


   PRN Reason: Cough


Budesonide (Pulmicort)  0.5 mg NEB BID [facility]


   Last Admin: 01/19/20 08:40 Dose:  0.5 mg


Carvedilol (Coreg)  3.125 mg PO BIDMEALS [facility]


   Last Admin: 01/19/20 08:16 Dose:  3.125 mg


Docusate Sodium (Colace)  100 mg PO Q48H [facility]


   Last Admin: 01/17/20 13:07 Dose:  100 mg


Furosemide (Lasix)  40 mg PO DAILY [facility]


   Last Admin: 01/19/20 08:23 Dose:  40 mg


Insulin Human Lispro (Humalog)  0 unit SUBCUT QIDACANDBED [facility]; Protocol


   Last Admin: 01/19/20 08:15 Dose:  2 units


Levothyroxine Sodium (Synthroid)  88 mcg PO SUTUWETHSA [facility]


   Last Admin: 01/19/20 05:17 Dose:  88 mcg


Levothyroxine Sodium (Synthroid)  176 mcg PO MoFr@0600 [facility]


   Last Admin: 01/17/20 16:33 Dose:  176 mcg


Lisinopril (Prinivil)  5 mg PO DAILY [facility]


   Last Admin: 01/19/20 08:17 Dose:  5 mg


Magnesium Oxide (Magnesium Oxide)  400 mg PO DAILY [facility]


   Last Admin: 01/19/20 08:18 Dose:  400 mg


Metformin HCl (Glucophage)  500 mg PO BIDMEALS [facility]


Methylprednisolone Sodium Succinate (Solu-Medrol)  60 mg IVPUSH Q6H [facility]


   Last Admin: 01/19/20 05:17 Dose:  60 mg


Pantoprazole Sodium (Protonix***)  40 mg PO DAILY [facility]


   Last Admin: 01/19/20 08:23 Dose:  40 mg


Prednisone (Prednisone)  40 mg PO WITHBREAKFAST [facility]


   Last Admin: 01/19/20 08:38 Dose:  40 mg


Rosuvastatin Calcium (Crestor)  5 mg PO DAILY [facility]


   Last Admin: 01/19/20 08:19 Dose:  5 mg


Sodium Chloride (Saline Flush)  10 ml FLUSH ASDIRECTED PRN


   PRN Reason: Keep Vein Open


   Last Admin: 01/19/20 05:18 Dose:  10 ml





Discontinued Medications





Albuterol/Ipratropium (Duoneb 3.0-0.5 Mg/3 Ml)  3 ml NEB ONETIME ONE


   Stop: 01/17/20 07:22


   Last Admin: 01/17/20 07:42 Dose:  3 ml


Dexamethasone (Dexamethasone)  10 mg IVPUSH ONETIME ONE


   Stop: 01/17/20 07:22


   Last Admin: 01/17/20 07:43 Dose:  10 mg


Furosemide (Lasix)  40 mg IVPUSH NOW ONE


   Stop: 01/17/20 07:15


   Last Admin: 01/17/20 07:53 Dose:  40 mg


Furosemide (Lasix)  40 mg IVPUSH DAILY [facility]


Piperacillin Sod/Tazobactam (Sod 3.375 gm/ Sodium Chloride)  50 mls @ 100 mls/

hr IV .ONCE ONE


   Stop: 01/17/20 09:46


   Last Admin: 01/17/20 09:25 Dose:  100 mls/hr


Piperacillin Sod/Tazobactam (Sod 3.375 gm/ Sodium Chloride)  50 mls @ 100 mls/

hr IV Q6H [facility]


   Last Admin: 01/18/20 04:36 Dose:  100 mls/hr


Iopamidol (Isovue-370 (76%))  100 ml IV .AS DIRECTED ONE


   Stop: 01/17/20 08:28


   Last Admin: 01/17/20 08:33 Dose:  66 ml


Labetalol HCl (Normodyne)  10 mg IVPUSH ONETIME ONE; Protocol


   Stop: 01/17/20 08:01


   Last Admin: 01/17/20 08:00 Dose:  10 mg


Warfarin Sodium (Coumadin Sliding Scale)  1 each PO ASDIRECTED [facility]











- Exam


General: Alert, Oriented, Cooperative


Lungs: Clear to Auscultation, Normal Respiratory Effort.  No: Crackles, Rales, 

Rhonchi, Wheezing


Cardiovascular: Regular Rate, No Murmurs


Extremities: No Pedal Edema





Sepsis Event Note





- Evaluation


Sepsis Screening Result: No Definite Risk





- Focused Exam


Vital Signs: 


 Vital Signs











  Temp Pulse Pulse Resp BP BP Pulse Ox


 


 01/19/20 08:40    78    


 


 01/19/20 08:17      178/84 H  


 


 01/19/20 08:16   78    178/84 H  


 


 01/19/20 00:00  97.8 F   73  18   155/78 H  98











Date Exam was Performed: 01/19/20


Time Exam was Performed: 09:57





- Problem List & Annotations


(1) Acute exacerbation of congestive heart failure


SNOMED Code(s): 229494246, 69342104079457


   Code(s): I50.9 - HEART FAILURE, UNSPECIFIED   Status: Acute   Current Visit: 

Yes   


Qualifiers: 


   Heart failure type: unspecified   Qualified Code(s): I50.9 - Heart failure, 

unspecified   





(2) Exacerbation of asthma


SNOMED Code(s): 627731793


   Code(s): J45.901 - UNSPECIFIED ASTHMA WITH (ACUTE) EXACERBATION   Status: 

Acute   Current Visit: Yes   


Qualifiers: 


   Asthma severity: moderate   Asthma persistence: persistent   Qualified Code(s

): J45.41 - Moderate persistent asthma with (acute) exacerbation   





(3) Pneumonia


SNOMED Code(s): 068478020


   Code(s): J18.9 - PNEUMONIA, UNSPECIFIED ORGANISM   Status: Acute   Current 

Visit: Yes   


Qualifiers: 


   Pneumonia type: due to unspecified organism   Laterality: bilateral   Lung 

location: lower lobe of lung   Qualified Code(s): J18.9 - Pneumonia, 

unspecified organism   





(4) Respiratory distress


SNOMED Code(s): 355850296


   Code(s): R06.03 - ACUTE RESPIRATORY DISTRESS   Status: Acute   Current Visit

: Yes   





(5) CKD (chronic kidney disease), stage III


SNOMED Code(s): 298916735


   Code(s): N18.3 - CHRONIC KIDNEY DISEASE, STAGE 3 (MODERATE)   Status: Acute 

  Current Visit: No   Annotation/Comment:: Baseline creatinine is 1.4 range 

which is where she was here. Continue to monitor.   





(6) Palliative care status


SNOMED Code(s): 710508157


   Code(s): Z51.5 - ENCOUNTER FOR PALLIATIVE CARE   Status: Acute   Current 

Visit: No   





(7) Hyponatremia


SNOMED Code(s): 89398735


   Code(s): E87.1 - HYPO-OSMOLALITY AND HYPONATREMIA   Status: Acute   Current 

Visit: Yes   





- Problem List Review


Problem List Initiated/Reviewed/Updated: Yes





- My Orders


Last 24 Hours: 


My Active Orders





01/18/20 09:00


Furosemide [Lasix]   40 mg PO DAILY 





01/18/20 17:40


Insulin Lispro [HumaLOG]   See Protocol  SUBCUT QIDACANDBED 





01/18/20 17:45


Accu Check [Blood Glucose Check, Bedside] [RC] QIDACANDBED 





01/18/20 Lunch


Consistent Carbohydrate Diet [DIET] 





01/19/20 08:00


predniSONE   40 mg PO WITHBREAKFAST 





01/20/20 06:00


INR,PT,PROTHROMBIN TIME [COAG] DAILY 





01/21/20 06:00


INR,PT,PROTHROMBIN TIME [COAG] DAILY 





01/22/20 06:00


INR,PT,PROTHROMBIN TIME [COAG] DAILY 





01/23/20 06:00


INR,PT,PROTHROMBIN TIME [COAG] DAILY 














- Plan


Plan:: 


1. Transfer back to [facility] on 20 mg of prednisone a day for 5 days, 

Augmentin and increasing Lasix to 40 mg a day. Reviewed the CT scan report and 

I believe the patient is doing well and maybe got confused because she felt 

disoriented and his place. Which would not be surprising at her age.

## 2020-01-19 NOTE — PCM.PN
- General Info


Date of Service: 01/19/20


Admission Dx/Problem (Free Text): 





Numbness to hands


Subjective Update: 





Called to see patient by RN because she had trouble standing during transfer to 

the toilet, then became very upset and anxious. Patient complains of numbness 

to hands bilaterally and appears very anxious, she believes she may be having a 

stroke. She denies focal weakness or headache. Patient states she had a stroke @

30 yrs ago presenting with left sided weakness.





- Review of Systems


General: Reports: No Symptoms


HEENT: Reports: No Symptoms


Pulmonary: Reports: No Symptoms


Cardiovascular: Reports: No Symptoms


Gastrointestinal: Reports: No Symptoms


Genitourinary: Reports: No Symptoms


Musculoskeletal: Reports: No Symptoms


Skin: Reports: No Symptoms


Neurological: Reports: Numbness (hands)


Psychiatric: Reports: No Symptoms





- Patient Data


Vitals - Most Recent: 


 Last Vital Signs











Temp  36.6 C   01/19/20 00:00


 


Pulse  73   01/19/20 00:00


 


Resp  18   01/19/20 00:00


 


BP  155/78 H  01/19/20 00:00


 


Pulse Ox  98   01/19/20 00:00











Weight - Most Recent: 76.612 kg


I&O - Last 24 Hours: 


 Intake & Output











 01/18/20 01/18/20 01/19/20





 14:59 22:59 06:59


 


Intake Total 950 700 150


 


Output Total  550 275


 


Balance 950 150 -125











Lab Results Last 24 Hours: 


 Laboratory Results - last 24 hr











  01/18/20 01/18/20 01/18/20 Range/Units





  06:25 06:25 06:25 


 


WBC  3.7 L    (4.5-12.0)  X10-3/uL


 


RBC  3.08 L    (3.23-5.20)  x10(6)uL


 


Hgb  9.0 L    (11.5-15.5)  g/dL


 


Hct  27.5 L    (30.0-51.3)  %


 


MCV  89.2    (80-96)  fL


 


MCH  29.2    (27.7-33.6)  pg


 


MCHC  32.8    (32.2-35.4)  g/dL


 


RDW  14.1    (11.5-15.5)  %


 


Plt Count  223    (125-369)  X10(3)uL


 


MPV  8.6    (7.4-10.4)  fL


 


Neut % (Auto)  83.6 H    (46-82)  %


 


Lymph % (Auto)  12.2 L    (13-37)  %


 


Mono % (Auto)  4.2    (4-12)  %


 


Eos % (Auto)  0 L    (1.0-5.0)  %


 


Baso % (Auto)  0    (0-2)  %


 


Neut # (Auto)  3.0    (1.6-8.3)  #


 


Lymph # (Auto)  0.5 L    (0.6-5.0)  #


 


Mono # (Auto)  0.2    (0.0-1.3)  #


 


Eos # (Auto)  0.0    (0.0-0.8)  #


 


Baso # (Auto)  0.0    (0.0-0.2)  #


 


PT   36.5 H*   (8.7-11.1)  


 


INR   3.81 H   (0.89-1.13)  


 


Sodium    133 L  (135-145)  mmol/L


 


Potassium    4.2  (3.5-5.3)  mmol/L


 


Chloride    96 L  (100-110)  mmol/L


 


Carbon Dioxide    25  (21-32)  mmol/L


 


BUN    38 H D  (7-18)  mg/dL


 


Creatinine    1.6 H  (0.55-1.02)  mg/dL


 


Est Cr Clr Drug Dosing    19.40  mL/min


 


Estimated GFR (MDRD)    31 L  (>60)  


 


BUN/Creatinine Ratio    23.8 H  (9-20)  


 


Glucose    224 H  ()  mg/dL


 


POC Glucose     ()  mg/dL


 


Calcium    8.9  (8.6-10.2)  mg/dL














  01/18/20 01/18/20 01/18/20 Range/Units





  06:33 17:10 20:04 


 


WBC     (4.5-12.0)  X10-3/uL


 


RBC     (3.23-5.20)  x10(6)uL


 


Hgb     (11.5-15.5)  g/dL


 


Hct     (30.0-51.3)  %


 


MCV     (80-96)  fL


 


MCH     (27.7-33.6)  pg


 


MCHC     (32.2-35.4)  g/dL


 


RDW     (11.5-15.5)  %


 


Plt Count     (125-369)  X10(3)uL


 


MPV     (7.4-10.4)  fL


 


Neut % (Auto)     (46-82)  %


 


Lymph % (Auto)     (13-37)  %


 


Mono % (Auto)     (4-12)  %


 


Eos % (Auto)     (1.0-5.0)  %


 


Baso % (Auto)     (0-2)  %


 


Neut # (Auto)     (1.6-8.3)  #


 


Lymph # (Auto)     (0.6-5.0)  #


 


Mono # (Auto)     (0.0-1.3)  #


 


Eos # (Auto)     (0.0-0.8)  #


 


Baso # (Auto)     (0.0-0.2)  #


 


PT     (8.7-11.1)  


 


INR     (0.89-1.13)  


 


Sodium     (135-145)  mmol/L


 


Potassium     (3.5-5.3)  mmol/L


 


Chloride     (100-110)  mmol/L


 


Carbon Dioxide     (21-32)  mmol/L


 


BUN     (7-18)  mg/dL


 


Creatinine     (0.55-1.02)  mg/dL


 


Est Cr Clr Drug Dosing     mL/min


 


Estimated GFR (MDRD)     (>60)  


 


BUN/Creatinine Ratio     (9-20)  


 


Glucose     ()  mg/dL


 


POC Glucose  235 H  367 H D  301 H  ()  mg/dL


 


Calcium     (8.6-10.2)  mg/dL














  01/19/20 Range/Units





  05:29 


 


WBC   (4.5-12.0)  X10-3/uL


 


RBC   (3.23-5.20)  x10(6)uL


 


Hgb   (11.5-15.5)  g/dL


 


Hct   (30.0-51.3)  %


 


MCV   (80-96)  fL


 


MCH   (27.7-33.6)  pg


 


MCHC   (32.2-35.4)  g/dL


 


RDW   (11.5-15.5)  %


 


Plt Count   (125-369)  X10(3)uL


 


MPV   (7.4-10.4)  fL


 


Neut % (Auto)   (46-82)  %


 


Lymph % (Auto)   (13-37)  %


 


Mono % (Auto)   (4-12)  %


 


Eos % (Auto)   (1.0-5.0)  %


 


Baso % (Auto)   (0-2)  %


 


Neut # (Auto)   (1.6-8.3)  #


 


Lymph # (Auto)   (0.6-5.0)  #


 


Mono # (Auto)   (0.0-1.3)  #


 


Eos # (Auto)   (0.0-0.8)  #


 


Baso # (Auto)   (0.0-0.2)  #


 


PT   (8.7-11.1)  


 


INR   (0.89-1.13)  


 


Sodium   (135-145)  mmol/L


 


Potassium   (3.5-5.3)  mmol/L


 


Chloride   (100-110)  mmol/L


 


Carbon Dioxide   (21-32)  mmol/L


 


BUN   (7-18)  mg/dL


 


Creatinine   (0.55-1.02)  mg/dL


 


Est Cr Clr Drug Dosing   mL/min


 


Estimated GFR (MDRD)   (>60)  


 


BUN/Creatinine Ratio   (9-20)  


 


Glucose   ()  mg/dL


 


POC Glucose  224 H  ()  mg/dL


 


Calcium   (8.6-10.2)  mg/dL











Alonso Results Last 24 Hours: 


 Microbiology











 01/17/20 07:30 Aerobic Blood Culture - Preliminary





 Blood - Venous    NO GROWTH AFTER 1 DAY





 Anaerobic Blood Culture - Preliminary





    NO GROWTH AFTER 1 DAY


 


 01/17/20 07:40 Aerobic Blood Culture - Preliminary





 Blood - Venous - Lab Draw    NO GROWTH AFTER 1 DAY





 Anaerobic Blood Culture - Preliminary





    NO GROWTH AFTER 1 DAY











Med Orders - Current: 


 Current Medications





Acetaminophen (Tylenol)  650 mg PO Q4H PRN


   PRN Reason: Pain/Fever


Albuterol (Proventil Neb Soln)  2.5 mg NEB Q2H PRN


   PRN Reason: Shortness Of Breath/wheezing


Albuterol/Ipratropium (Duoneb 3.0-0.5 Mg/3 Ml)  3 ml INH QIDRT [facility]


   Last Admin: 01/18/20 20:00 Dose:  3 ml


Benzonatate (Tessalon Perles)  100 mg PO TID PRN


   PRN Reason: Cough


Budesonide (Pulmicort)  0.5 mg NEB BID [facility]


   Last Admin: 01/18/20 20:18 Dose:  0.5 mg


Carvedilol (Coreg)  3.125 mg PO BIDMEALS [facility]


   Last Admin: 01/18/20 18:04 Dose:  3.125 mg


Docusate Sodium (Colace)  100 mg PO Q48H [facility]


   Last Admin: 01/17/20 13:07 Dose:  100 mg


Furosemide (Lasix)  40 mg PO DAILY [facility]


   Last Admin: 01/18/20 09:05 Dose:  40 mg


Insulin Human Lispro (Humalog)  0 unit SUBCUT QIDACANDBED [facility]; Protocol


   Last Admin: 01/18/20 20:04 Dose:  4 units


Levothyroxine Sodium (Synthroid)  88 mcg PO SUTUWETHSA [facility]


   Last Admin: 01/19/20 05:17 Dose:  88 mcg


Levothyroxine Sodium (Synthroid)  176 mcg PO MoFr@0600 [facility]


   Last Admin: 01/17/20 16:33 Dose:  176 mcg


Lisinopril (Prinivil)  5 mg PO DAILY [facility]


   Last Admin: 01/18/20 09:06 Dose:  5 mg


Magnesium Oxide (Magnesium Oxide)  400 mg PO DAILY [facility]


   Last Admin: 01/18/20 09:08 Dose:  400 mg


Metformin HCl (Glucophage)  500 mg PO BIDMEALS [facility]


Methylprednisolone Sodium Succinate (Solu-Medrol)  60 mg IVPUSH Q6H [facility]


   Last Admin: 01/19/20 05:17 Dose:  60 mg


Pantoprazole Sodium (Protonix***)  40 mg PO DAILY [facility]


   Last Admin: 01/18/20 09:07 Dose:  40 mg


Prednisone (Prednisone)  40 mg PO WITHBREAKFAST [facility]


Rosuvastatin Calcium (Crestor)  5 mg PO DAILY [facility]


   Last Admin: 01/18/20 09:06 Dose:  5 mg


Sodium Chloride (Saline Flush)  10 ml FLUSH ASDIRECTED PRN


   PRN Reason: Keep Vein Open


   Last Admin: 01/19/20 05:18 Dose:  10 ml





Discontinued Medications





Albuterol/Ipratropium (Duoneb 3.0-0.5 Mg/3 Ml)  3 ml NEB ONETIME ONE


   Stop: 01/17/20 07:22


   Last Admin: 01/17/20 07:42 Dose:  3 ml


Dexamethasone (Dexamethasone)  10 mg IVPUSH ONETIME ONE


   Stop: 01/17/20 07:22


   Last Admin: 01/17/20 07:43 Dose:  10 mg


Furosemide (Lasix)  40 mg IVPUSH NOW ONE


   Stop: 01/17/20 07:15


   Last Admin: 01/17/20 07:53 Dose:  40 mg


Furosemide (Lasix)  40 mg IVPUSH DAILY [facility]


Piperacillin Sod/Tazobactam (Sod 3.375 gm/ Sodium Chloride)  50 mls @ 100 mls/

hr IV .ONCE ONE


   Stop: 01/17/20 09:46


   Last Admin: 01/17/20 09:25 Dose:  100 mls/hr


Piperacillin Sod/Tazobactam (Sod 3.375 gm/ Sodium Chloride)  50 mls @ 100 mls/

hr IV Q6H [facility]


   Last Admin: 01/18/20 04:36 Dose:  100 mls/hr


Iopamidol (Isovue-370 (76%))  100 ml IV .AS DIRECTED ONE


   Stop: 01/17/20 08:28


   Last Admin: 01/17/20 08:33 Dose:  66 ml


Labetalol HCl (Normodyne)  10 mg IVPUSH ONETIME ONE; Protocol


   Stop: 01/17/20 08:01


   Last Admin: 01/17/20 08:00 Dose:  10 mg


Warfarin Sodium (Coumadin Sliding Scale)  1 each PO ASDIRECTED [facility]











- Exam


General: Alert, Oriented


HEENT: Pupils Equal, Pupils Reactive, EOMI


Neck: Supple


Extremities: Normal Inspection, Normal Range of Motion


Neurological: No New Focal Deficit, Normal Speech, Strength Equal Bilateral, 

Sensation Intact, Cranial Nerves Intact


Psy/Mental Status: Alert, Anxious





Sepsis Event Note





- Evaluation


Sepsis Screening Result: No Definite Risk





- Focused Exam


Vital Signs: 


 Vital Signs











  Temp Pulse Resp BP Pulse Ox


 


 01/19/20 00:00  36.6 C  73  18  155/78 H  98











Date Exam was Performed: 01/19/20


Time Exam was Performed: 06:25





- Problem List Review


Problem List Initiated/Reviewed/Updated: Yes





- My Orders


Last 24 Hours: 


My Active Orders





01/18/20 06:00


Levothyroxine [Synthroid]   88 mcg PO SUTUWETHSA 





01/19/20 06:20


Head wo Cont [CT] Stat 





01/19/20 06:21


EKG Documentation Completion [RC] ASDIRECTED 


EKG 12 Lead [EK] Stat 














- Assessment


Assessment:: 





Numbness to bilateral hands.





- Plan


Plan:: 


EKG and Head CT, labs pending this morning.